# Patient Record
Sex: MALE | Race: AMERICAN INDIAN OR ALASKA NATIVE
[De-identification: names, ages, dates, MRNs, and addresses within clinical notes are randomized per-mention and may not be internally consistent; named-entity substitution may affect disease eponyms.]

---

## 2017-06-22 ENCOUNTER — HOSPITAL ENCOUNTER (OUTPATIENT)
Dept: HOSPITAL 5 - LAB | Age: 67
Discharge: HOME | End: 2017-06-22
Attending: FAMILY MEDICINE
Payer: COMMERCIAL

## 2017-06-22 DIAGNOSIS — I10: Primary | ICD-10-CM

## 2017-06-22 DIAGNOSIS — K21.0: ICD-10-CM

## 2017-06-22 DIAGNOSIS — E78.2: ICD-10-CM

## 2017-06-22 LAB
ALBUMIN SERPL-MCNC: 3.9 G/DL (ref 3.9–5)
ALBUMIN/GLOB SERPL: 1.1 %
ALP SERPL-CCNC: 63 UNITS/L (ref 35–129)
ALT SERPL-CCNC: 14 UNITS/L (ref 7–56)
ANION GAP SERPL CALC-SCNC: 17 MMOL/L
BASOPHILS NFR BLD AUTO: 0.6 % (ref 0–1.8)
BILIRUB SERPL-MCNC: 0.4 MG/DL (ref 0.1–1.2)
BUN SERPL-MCNC: 13 MG/DL (ref 9–20)
BUN/CREAT SERPL: 11.81 %
CALCIUM SERPL-MCNC: 10.1 MG/DL (ref 8.4–10.2)
CHLORIDE SERPL-SCNC: 106.3 MMOL/L (ref 98–107)
CHOLEST SERPL-MCNC: 170 MG/DL (ref 50–199)
CO2 SERPL-SCNC: 23 MMOL/L (ref 22–30)
CRP SERPL-MCNC: 0.2 MG/DL (ref 0–1.3)
EOSINOPHIL NFR BLD AUTO: 0.2 % (ref 0–4.3)
GLUCOSE SERPL-MCNC: 74 MG/DL (ref 75–100)
HCT VFR BLD CALC: 32.9 % (ref 35.5–45.6)
HDLC SERPL-MCNC: 56 MG/DL (ref 40–59)
HGB BLD-MCNC: 10.2 GM/DL (ref 11.8–15.2)
MCH RBC QN AUTO: 23 PG (ref 28–32)
MCHC RBC AUTO-ENTMCNC: 31 % (ref 32–34)
MCV RBC AUTO: 73 FL (ref 84–94)
PLATELET # BLD: 297 K/MM3 (ref 140–440)
POTASSIUM SERPL-SCNC: 4.1 MMOL/L (ref 3.6–5)
PROT SERPL-MCNC: 7.3 G/DL (ref 6.3–8.2)
PSA SERPL-MCNC: 4.37 NG/ML (ref 0–4)
RBC # BLD AUTO: 4.51 M/MM3 (ref 3.65–5.03)
SODIUM SERPL-SCNC: 142 MMOL/L (ref 137–145)
TRIGL SERPL-MCNC: 171 MG/DL (ref 2–149)
URATE SERPL-MCNC: 6 MG/DL (ref 3.5–7.6)
WBC # BLD AUTO: 4 K/MM3 (ref 4.5–11)

## 2017-06-22 PROCEDURE — 84550 ASSAY OF BLOOD/URIC ACID: CPT

## 2017-06-22 PROCEDURE — 80053 COMPREHEN METABOLIC PANEL: CPT

## 2017-06-22 PROCEDURE — 86140 C-REACTIVE PROTEIN: CPT

## 2017-06-22 PROCEDURE — 80061 LIPID PANEL: CPT

## 2017-06-22 PROCEDURE — 86803 HEPATITIS C AB TEST: CPT

## 2017-06-22 PROCEDURE — 36415 COLL VENOUS BLD VENIPUNCTURE: CPT

## 2017-06-22 PROCEDURE — 84153 ASSAY OF PSA TOTAL: CPT

## 2017-06-22 PROCEDURE — 85025 COMPLETE CBC W/AUTO DIFF WBC: CPT

## 2017-06-22 PROCEDURE — 84443 ASSAY THYROID STIM HORMONE: CPT

## 2017-06-22 PROCEDURE — 84436 ASSAY OF TOTAL THYROXINE: CPT

## 2018-04-26 ENCOUNTER — HOSPITAL ENCOUNTER (EMERGENCY)
Dept: HOSPITAL 5 - ED | Age: 68
LOS: 1 days | Discharge: HOME | End: 2018-04-27
Payer: COMMERCIAL

## 2018-04-26 DIAGNOSIS — Z88.8: ICD-10-CM

## 2018-04-26 DIAGNOSIS — M25.562: Primary | ICD-10-CM

## 2018-04-26 DIAGNOSIS — I10: ICD-10-CM

## 2018-04-26 PROCEDURE — 99283 EMERGENCY DEPT VISIT LOW MDM: CPT

## 2018-04-26 NOTE — XRAY REPORT
FINAL REPORT



PROCEDURE:  XR KNEE 3V LT



TECHNIQUE:  LEFT knee radiographs, AP, lateral and sunrise views.

CPT 79975







HISTORY:  left knee pain 



COMPARISON:  No prior studies are available for comparison.



FINDINGS:  

Fracture (s) and/or Dislocation(s): None .



Alignment: Normal .



Joint space(s): Normal .



Soft tissues: Normal .



Bone mineralization: Mild degree osteophyte formation is noted

involving tibial femoral and patellofemoral compartments..



Foreign bodies: None .







IMPRESSION:  

No acute fracture



Mild degree osteoarthritis.

## 2018-04-27 VITALS — DIASTOLIC BLOOD PRESSURE: 87 MMHG | SYSTOLIC BLOOD PRESSURE: 158 MMHG

## 2018-04-27 NOTE — EMERGENCY DEPARTMENT REPORT
ED Extremity Problem HPI





- General


Chief complaint: Extremity Injury, Lower


Stated complaint: LT KNEE PAIN


Time Seen by Provider: 04/27/18 01:04


Source: patient


Mode of arrival: Ambulatory


Limitations: No Limitations





- History of Present Illness


Initial comments: 





68-year-old -American male comes in for left knee pain 1 week.  Patient 

reports the pain is on initial getting but none during ambulation.  Patient 

reports has no relief from ibuprofen.  Patient does work driving a bus and 

moving cars.  He denies any recent trauma.


MD Complaint: extremity pain, joint paint





- Related Data


 Home Medications











 Medication  Instructions  Recorded  Confirmed  Last Taken


 


Quinapril HCl [Accupril] 1 tab PO DAILY 04/26/18 04/26/18 Unknown


 


amLODIPine 5 mg PO DAILY 04/26/18 04/26/18 Unknown








 Previous Rx's











 Medication  Instructions  Recorded  Last Taken  Type


 


Naproxen [Naprosyn TAB] 500 mg PO BID #60 tablet 04/27/18 Unknown Rx











 Allergies











Allergy/AdvReac Type Severity Reaction Status Date / Time


 


codeine Allergy  Seizure Unverified 06/22/17 09:57














ED Review of Systems


ROS: 


Stated complaint: LT KNEE PAIN


Other details as noted in HPI





Constitutional: denies: chills, fever


Eyes: denies: eye pain, eye discharge, vision change


ENT: denies: ear pain, throat pain


Respiratory: denies: cough, shortness of breath, wheezing


Cardiovascular: denies: chest pain, palpitations


Endocrine: no symptoms reported


Gastrointestinal: denies: abdominal pain, nausea, diarrhea


Genitourinary: denies: urgency, dysuria


Musculoskeletal: arthralgia


Skin: denies: rash, lesions


Neurological: denies: headache, weakness, paresthesias


Psychiatric: denies: anxiety, depression


Hematological/Lymphatic: denies: easy bleeding, easy bruising





ED Past Medical Hx





- Past Medical History


Hx Hypertension: Yes





- Surgical History


Past Surgical History?: No





- Social History


Smoking Status: Never Smoker


Substance Use Type: None





- Medications


Home Medications: 


 Home Medications











 Medication  Instructions  Recorded  Confirmed  Last Taken  Type


 


Quinapril HCl [Accupril] 1 tab PO DAILY 04/26/18 04/26/18 Unknown History


 


amLODIPine 5 mg PO DAILY 04/26/18 04/26/18 Unknown History


 


Naproxen [Naprosyn TAB] 500 mg PO BID #60 tablet 04/27/18  Unknown Rx














ED Physical Exam





- General


Limitations: No Limitations


General appearance: alert, in no apparent distress





- Head


Head exam: Present: atraumatic, normocephalic





- Eye


Eye exam: Present: normal appearance





- Respiratory


Respiratory exam: Present: normal lung sounds bilaterally.  Absent: respiratory 

distress





- Cardiovascular


Cardiovascular Exam: Present: regular rate, normal rhythm.  Absent: systolic 

murmur, diastolic murmur, rubs, gallop





- Expanded Lower Extremity Exam


  ** Left


Hip exam: Present: normal inspection, full ROM


Upper Leg exam: Present: normal inspection, full ROM


Knee exam: Present: normal inspection, full ROM, crepidus.  Absent: tenderness, 

swelling


Lower Leg exam: Present: normal inspection, full ROM.  Absent: tenderness, 

swelling


Ankle exam: Present: normal inspection, full ROM.  Absent: tenderness, swelling


Foot/Toe exam: Present: normal inspection


Gait: Positive: observed and normal





- Back Exam


Back exam: Present: normal inspection





- Neurological Exam


Neurological exam: Present: alert, oriented X3





- Psychiatric


Psychiatric exam: Present: normal affect, normal mood





- Skin


Skin exam: Present: warm, dry, intact, normal color.  Absent: rash





ED Course





 Vital Signs











  04/26/18





  22:15


 


Temperature 98.1 F


 


Pulse Rate 84


 


Respiratory 16





Rate 


 


Blood Pressure 167/93


 


O2 Sat by Pulse 96





Oximetry 














ED Medical Decision Making





- Radiology Data


Radiology results: report reviewed, image reviewed





IMPRESSION: 


No acute fracture 





Mild degree osteoarthritis. 














- Medical Decision Making





Patient's been evaluated by this provider fast track.  X-ray shows mild 

degenerative changes.  No dislocation no fractures.  Discussed with family and 

patient that this appears to be arthritis treatment issues E NSAIDs I would 

discharge patient on naproxen 500 mg twice a day.  Discussed the patient he 

should follow up with orthopedics for further evaluation and treatment options.


Critical care attestation.: 


If time is entered above; I have spent that time in minutes in the direct care 

of this critically ill patient, excluding procedure time.








ED Disposition


Clinical Impression: 


 Arthritis of left knee





Disposition: DC-01 TO HOME OR SELFCARE


Is pt being admited?: No


Does the pt Need Aspirin: No


Condition: Stable


Instructions:  Osteoarthritis (ED), Degenerative Disc Disease (ED)


Additional Instructions: 


Take pain medication as prescribed.  Please take her blood pressure medicine 

when you get home.  Please follow-up with orthopedics for further evaluation.


Prescriptions: 


Naproxen [Naprosyn TAB] 500 mg PO BID #60 tablet


Referrals: 


KANU SANDERSON JR, MD [Primary Care Provider] - 3-5 Days


LIZETH PERRY MD [Staff Physician] - 3-5 Days


ABIGAIL JENKINS MD [Staff Physician] - 3-5 Days


Forms:  Work/School Release Form(ED), Accompanied Note

## 2018-06-13 ENCOUNTER — HOSPITAL ENCOUNTER (OUTPATIENT)
Dept: HOSPITAL 5 - MRI | Age: 68
Discharge: HOME | End: 2018-06-13
Attending: ORTHOPAEDIC SURGERY
Payer: COMMERCIAL

## 2018-06-13 DIAGNOSIS — S83.242A: ICD-10-CM
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
## 2018-06-13 DIAGNOSIS — M22.42: ICD-10-CM
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
## 2018-06-13 DIAGNOSIS — M17.12: Primary | ICD-10-CM
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
## 2018-06-13 DIAGNOSIS — I10: ICD-10-CM

## 2018-06-13 PROCEDURE — 73721 MRI JNT OF LWR EXTRE W/O DYE: CPT

## 2018-07-10 ENCOUNTER — HOSPITAL ENCOUNTER (OUTPATIENT)
Dept: HOSPITAL 5 - OR | Age: 68
Discharge: HOME | End: 2018-07-10
Attending: ORTHOPAEDIC SURGERY
Payer: COMMERCIAL

## 2018-07-10 VITALS — DIASTOLIC BLOOD PRESSURE: 93 MMHG | SYSTOLIC BLOOD PRESSURE: 148 MMHG

## 2018-07-10 DIAGNOSIS — M23.92: ICD-10-CM
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
## 2018-07-10 DIAGNOSIS — K21.9: ICD-10-CM
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
## 2018-07-10 DIAGNOSIS — I10: ICD-10-CM

## 2018-07-10 DIAGNOSIS — M94.262: ICD-10-CM

## 2018-07-10 DIAGNOSIS — Z88.5: ICD-10-CM

## 2018-07-10 DIAGNOSIS — S83.242A: Primary | ICD-10-CM

## 2018-07-10 PROCEDURE — A4217 STERILE WATER/SALINE, 500 ML: HCPCS

## 2018-07-10 PROCEDURE — 29882 ARTHRS KNE SRG MNISC RPR M/L: CPT

## 2018-07-10 NOTE — ANESTHESIA CONSULTATION
Anesthesia Consult and Med Hx


Date of service: 07/10/18





- Airway


Anesthetic Teeth Evaluation: Good


ROM Head & Neck: Adequate


Mental/Hyoid Distance: Adequate


Mallampati Class: Class II


Intubation Access Assessment: Probably Good





- Pulmonary Exam


CTA: Yes





- Cardiac Exam


Cardiac Exam: RRR





- Pre-Operative Health Status


ASA Pre-Surgery Classification: ASA2


Proposed Anesthetic Plan: General (GA with LMA ok, GERD contolled)





- Pulmonary


Hx Smoking: No


Hx Sleep Apnea: No (SIDDHARTHA PRE SCREEN HIGH RISK.)





- Cardiovascular System


Hx Hypertension: Yes (X 10 YRS)





- Other Systems


Hx Cancer: No

## 2018-07-10 NOTE — PROCEDURE NOTE
Date of procedure: 07/10/18


Pre-op diagnosis: internal derangement left knee


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 

compartment)


Procedure: 





Arthroscopy left knee partial medial meniscectomy and abrasion chondroplasty 

medial compartment





Procedure


The patient was brought to the OR and placed on the OR table in supine position 

following induction and intubation by anesthesia the patient's left lower 

extremity was prepped and draped in the usual sterile manner.  A timeout 

procedure was done to identify the patient and the correct operative site.  The 

leg was exsanguinated followed by inflation of the pneumatic tourniquet to 300 

mmHg routine arthroscopic portals were made about the patella tendon following 

introduction of the arthroscope and insufflation of the joint with normal 

saline solution examination revealed these findings the patient was noted to 

have grade 3-3 chondromalacia involving both the medial femoral condyle and a 

corresponding tibial articular surfaces there is also a horizontal cleavage 

tear noted in the posterior horn of the medial meniscus the anterior cruciate 

ligament was intact the lateral compartment was explored the patient was there 

is having a intact lateral meniscus and some grade 1-2 changes in the articular 

surface in the lateral compartment following this the suprapatellar pouch was 

examined no loose bodies or other pathology was seen here. The arthroscopic 

shaver was introduced into the knee joint nexy the articular cartilage was then 

debrided back to healthy-appearing cartilage tissue followed by debridement of 

the posterior horn of the medial meniscus using a combination of biting forceps 

and the 4.0 shaver again care was taken to remove only tissue did appear to 

flap in and out of the knee joint following debridement the knee was copiously 

irrigated with saline solution the arthroscope was removed and the stab wound 

were repaired A mixture of Depo-Medrol and Marcaine was injected followed by 

placing routine postoperative dressings and Ace wraps to the thigh in the area 

the patient tolerated the procedure there were no complications he was sent to 

postanesthesia recovery in stable condition


Anesthesia: GETA


Surgeon: ABIGAIL JENKINS


Estimated blood loss: none


Pathology: list


Condition: stable


Disposition: PACU

## 2018-12-06 ENCOUNTER — HOSPITAL ENCOUNTER (OUTPATIENT)
Dept: HOSPITAL 5 - OR | Age: 68
Discharge: HOME | End: 2018-12-06
Attending: ORTHOPAEDIC SURGERY
Payer: COMMERCIAL

## 2018-12-06 VITALS — DIASTOLIC BLOOD PRESSURE: 84 MMHG | SYSTOLIC BLOOD PRESSURE: 152 MMHG

## 2018-12-06 DIAGNOSIS — Z88.5: ICD-10-CM

## 2018-12-06 DIAGNOSIS — K21.9: ICD-10-CM

## 2018-12-06 DIAGNOSIS — Z79.01: ICD-10-CM

## 2018-12-06 DIAGNOSIS — G89.29: ICD-10-CM

## 2018-12-06 DIAGNOSIS — Z79.82: ICD-10-CM

## 2018-12-06 DIAGNOSIS — Z91.81: ICD-10-CM

## 2018-12-06 DIAGNOSIS — I10: ICD-10-CM

## 2018-12-06 DIAGNOSIS — M25.562: Primary | ICD-10-CM

## 2018-12-06 DIAGNOSIS — M19.90: ICD-10-CM

## 2018-12-06 DIAGNOSIS — Z79.899: ICD-10-CM

## 2018-12-06 NOTE — PROCEDURE NOTE
Date of procedure: 12/06/18


Pre-op diagnosis: chronic left knee pain


Post-op diagnosis: same


Procedure: 





Left Geniculate Nerve Block under C-arm fluroscopy





procedure


The patient taken to the radiology fluroscopy suite where he was place on the 

table supine with padded triangular pad placed along the potileal fossa. The 

left knee prepped and draped in usual sterile fashion. 22-gauge spinal needle 

used to locate areas for injection, the medial and lateral supracondylar ridges 

as well as the medial border of the proximal tibia. These areas were anesthized 

using lidocaine 1% followed by placement of spinal needle near the medial, and 

lateral geniculate nerves. A mixture of marcaine and lidocaine injected into the

deeper structures. There were no complications noted and he tolerated well.


Anesthesia: local


Surgeon: ABIGAIL JENKINS


Estimated blood loss: minimal


Pathology: none


Condition: stable


Disposition: observation

## 2018-12-07 NOTE — XRAY REPORT
XRAY LEFT KNEE FOUR INTRAOPERATIVE FLUOROSCOPIC VIEWS: 12/06/18 07:47:00



CLINICAL: Left knee pain.



FINDINGS: No fracture or dislocation.  The joint spaces are preserved.  

Initial images show placement of a needle just superior to the medial 

condyle.  Subsequent images demonstrate a needle just superior to the 

lateral femoral condyle and the final image shows a needle projected 

over the proximal tibia at the level of the tibial tubercle.  For more 

detail, please refer to the operative report.

## 2019-01-08 ENCOUNTER — HOSPITAL ENCOUNTER (OUTPATIENT)
Dept: HOSPITAL 5 - GIO | Age: 69
Discharge: HOME | End: 2019-01-08
Attending: INTERNAL MEDICINE
Payer: COMMERCIAL

## 2019-01-08 VITALS — SYSTOLIC BLOOD PRESSURE: 152 MMHG | DIASTOLIC BLOOD PRESSURE: 98 MMHG

## 2019-01-08 DIAGNOSIS — Z91.81: ICD-10-CM

## 2019-01-08 DIAGNOSIS — Z79.899: ICD-10-CM

## 2019-01-08 DIAGNOSIS — K64.8: ICD-10-CM

## 2019-01-08 DIAGNOSIS — R63.4: ICD-10-CM

## 2019-01-08 DIAGNOSIS — K21.9: ICD-10-CM

## 2019-01-08 DIAGNOSIS — Z88.5: ICD-10-CM

## 2019-01-08 DIAGNOSIS — M19.90: ICD-10-CM

## 2019-01-08 DIAGNOSIS — Z79.82: ICD-10-CM

## 2019-01-08 DIAGNOSIS — K62.5: Primary | ICD-10-CM

## 2019-01-08 DIAGNOSIS — Z79.01: ICD-10-CM

## 2019-01-08 PROCEDURE — 45378 DIAGNOSTIC COLONOSCOPY: CPT

## 2019-01-08 RX ADMIN — SODIUM CHLORIDE SCH MLS: 0.9 INJECTION, SOLUTION INTRAVENOUS at 09:42

## 2019-01-08 NOTE — ANESTHESIA CONSULTATION
Anesthesia Consult and Med Hx


Date of service: 01/08/19





- Airway


Anesthetic Teeth Evaluation: Good


ROM Head & Neck: Adequate


Mental/Hyoid Distance: Adequate


Mallampati Class: Class III


Intubation Access Assessment: Good





- Pulmonary Exam


CTA: Yes





- Cardiac Exam


Cardiac Exam: No Murmur





- Pre-Operative Health Status


ASA Pre-Surgery Classification: ASA3


Proposed Anesthetic Plan: MAC





- Pulmonary


Hx Smoking: No


Hx Asthma: No


Hx Respiratory Symptoms: No


Hx Sleep Apnea: No (SIDDHARTHA PRE SCREEN HIGH RISK.)





- Cardiovascular System


Hx Hypertension: Yes


Hx Heart Attack/AMI: No


Hx Percutaneous Transluminal Coronary Angioplasty (PTCA): No


Hx Cardia Arrhythmia: No





- Central Nervous System


Hx Seizures: No


CVA: No





- Gastrointestinal


Hx Gastroesophageal Reflux Disease: Yes (well controlled)





- Endocrine


Hx Renal Disease: No


Hx Liver Disease: No


Hx Insulin Dependent Diabetes: No


Hx Non-Insulin Dependent Diabetes: No


Hx Thyroid Disease: No





- Other Systems


Hx Cancer: No


Hx Obesity: No

## 2019-01-08 NOTE — SHORT STAY SUMMARY
Short Stay Documentation


Date of service: 01/08/19


Narrative H&P: 





The patient presents for routine screening colonoscopy.  Last study over 10  

years ago.





- History


Past Medical History: arthritis, hyperthyroidism


Past Surgical History: Other (hemorrhoid surgery)


Social history: no significant social history, , lives with family, no 

smoking, no alcohol abuse





- Allergies and Medications


Current Medications: 


                                    Allergies





codeine Allergy (Verified 07/03/18 16:25)


   Dizziness





                                Home Medications











 Medication  Instructions  Recorded  Confirmed  Last Taken  Type


 


Quinapril HCl [Accupril] 1 tab PO DAILY 04/26/18 01/04/19 12/09/18 09:00 History


 


Aspirin [Lo-Dose Aspirin EC] 81 mg PO DAILY 07/03/18 01/04/19 12/08/18 09:00 

History


 


Omeprazole 40 mg PO DAILY 07/03/18 01/04/19 12/09/18 09:00 History


 


Glucosamine HCl 500 mg PO QDAY 07/10/18 01/04/19 12/09/18 09:00 History


 


amLODIPine/ATORVASTATIN [Caduet 5 1 each PO QDAY 07/10/18 01/04/19 12/09/18 

09:00 History





mg-10 mg Tablet]     


 


HYDROcodone/APAP 5-325 [Driscoll 1 each PO Q4HR PRN #20 tablet 12/11/18 01/04/19 

Unknown Rx





5-325 mg TAB]     








Active Medications





Sodium Chloride (Nacl 0.9% 1000 Ml)  1,000 mls @ 50 mls/hr IV AS DIRECT KP











- Physical exam


General appearance: no acute distress, well-nourished


Integumentary: no rash, no growths, no abnormal pigmentation


HEENT: Atraumatic, PERRLA, EOMI, Mucous membr. moist/pink


Lungs: Clear to auscultation


Breasts: deferred


Heart: Regular rate, Normal S1, Normal S2, No murmurs


Gastrointestinal: normoactive bowel sounds, no tenderness, no distended, no 

masses, no guarding, no organomegaly


Male Genitourinary: deferred


Rectal Exam: normal exam-external/orifice, normal rectal tone


Extremities: no ischemia, pulses intact, pulses symmetrical, No edema, normal 

temperature, Full ROM


Neurological: Normal gait, Normal speech, Strength at 5/5 X4 ext, Normal tone, 

Sensation intact, Cranial nerves 3-12 NL





- Brief post op/procedure progress note


Date of procedure: 01/08/19


Findings: 





see dictation


Estimated blood loss: none


Pathology: none


Condition: stable





- Disposition


Condition at discharge: Good


Disposition: DC-01 TO HOME OR SELFCARE





- Discharge Diagnoses


(1) Colon cancer screening


Status: Acute   





Short Stay Discharge Plan


Activity: other (no driving for 24 hours)


Weight Bearing Status: Full Weight Bearing


Diet: regular


Follow up with: 


KANU SANDERSON JR, MD [Primary Care Provider] - 7 Days

## 2019-01-08 NOTE — OPERATIVE REPORT
Operative Report


Operative Report: 





Date of procedure: 01/08/2019





Preprocedure diagnosis: Colon cancer screening.  Last study over 10 years ago.





Post procedure diagnosis: Normal study





Procedure: Colonoscopy to the cecum





Endoscopist: Dr. Hall





Anesthesia: Monitored anesthesia care per anesthesia department





Estimated blood loss: 0





Medications: Monitored anesthesia care.  See separate report by anesthesia for 

details.





After careful discussion of the nature and purpose of the procedure as well as 

details of the technique risks benefits and alternatives the patient gave 

consent.  Please see recent history and physical from the office.  The patient 

was placed in the left lateral decubitus position and medicated per anesthesia. 

A rectal exam was performed sphincter tone was normal there were no masses 

palpable.





The [employer] 570 scope was passed transanally and advanced under continuous 

direct vision without difficulty to the cecum.  The colon was well prepared.  

The cecum was normal.  The ascending colon was normal and on forward and 

retroflexed views.  The transverse colon, descending colon, and sigmoid colon 

were normal.  The rectum was normal on forward and retroflexed views.  The 

procedure was well-tolerated overall and the patient was observed in recovery.





Conclusions: Normal colonoscopy to the cecum.





Plan: Repeat colonoscopy in 10 years, sooner if clinically indicated.








Signed electronically: Joby Hall M.D.

## 2019-01-09 RX ADMIN — SODIUM CHLORIDE SCH MLS: 0.9 INJECTION, SOLUTION INTRAVENOUS at 08:01

## 2019-01-30 ENCOUNTER — HOSPITAL ENCOUNTER (INPATIENT)
Dept: HOSPITAL 5 - ED | Age: 69
LOS: 2 days | Discharge: HOME | DRG: 301 | End: 2019-02-01
Attending: INTERNAL MEDICINE | Admitting: INTERNAL MEDICINE
Payer: COMMERCIAL

## 2019-01-30 DIAGNOSIS — Z88.5: ICD-10-CM

## 2019-01-30 DIAGNOSIS — R00.0: ICD-10-CM

## 2019-01-30 DIAGNOSIS — M19.90: ICD-10-CM

## 2019-01-30 DIAGNOSIS — R06.82: ICD-10-CM

## 2019-01-30 DIAGNOSIS — D50.9: ICD-10-CM

## 2019-01-30 DIAGNOSIS — I82.413: Primary | ICD-10-CM

## 2019-01-30 DIAGNOSIS — I10: ICD-10-CM

## 2019-01-30 DIAGNOSIS — Z79.82: ICD-10-CM

## 2019-01-30 DIAGNOSIS — D72.819: ICD-10-CM

## 2019-01-30 DIAGNOSIS — Z72.89: ICD-10-CM

## 2019-01-30 DIAGNOSIS — Z79.899: ICD-10-CM

## 2019-01-30 DIAGNOSIS — K21.9: ICD-10-CM

## 2019-01-30 LAB
ALBUMIN SERPL-MCNC: 3.9 G/DL (ref 3.9–5)
ALT SERPL-CCNC: 10 UNITS/L (ref 7–56)
APTT BLD: 25.3 SEC. (ref 24.2–36.6)
BASOPHILS # (AUTO): 0 K/MM3 (ref 0–0.1)
BASOPHILS NFR BLD AUTO: 0.4 % (ref 0–1.8)
BILIRUB DIRECT SERPL-MCNC: < 0.2 MG/DL (ref 0–0.2)
BUN SERPL-MCNC: 13 MG/DL (ref 9–20)
BUN/CREAT SERPL: 13 %
CALCIUM SERPL-MCNC: 10.3 MG/DL (ref 8.4–10.2)
EOSINOPHIL # BLD AUTO: 0 K/MM3 (ref 0–0.4)
EOSINOPHIL NFR BLD AUTO: 0.1 % (ref 0–4.3)
HCT VFR BLD CALC: 32.5 % (ref 35.5–45.6)
HEMOLYSIS INDEX: 3
HGB BLD-MCNC: 10 GM/DL (ref 11.8–15.2)
INR PPP: 0.98 (ref 0.87–1.13)
LYMPHOCYTES # BLD AUTO: 1.5 K/MM3 (ref 1.2–5.4)
LYMPHOCYTES NFR BLD AUTO: 36.9 % (ref 13.4–35)
MCHC RBC AUTO-ENTMCNC: 31 % (ref 32–34)
MCV RBC AUTO: 75 FL (ref 84–94)
MONOCYTES # (AUTO): 0.4 K/MM3 (ref 0–0.8)
MONOCYTES % (AUTO): 9.6 % (ref 0–7.3)
PLATELET # BLD: 292 K/MM3 (ref 140–440)
RBC # BLD AUTO: 4.36 M/MM3 (ref 3.65–5.03)

## 2019-01-30 PROCEDURE — 93010 ELECTROCARDIOGRAM REPORT: CPT

## 2019-01-30 PROCEDURE — 83550 IRON BINDING TEST: CPT

## 2019-01-30 PROCEDURE — 83036 HEMOGLOBIN GLYCOSYLATED A1C: CPT

## 2019-01-30 PROCEDURE — 84484 ASSAY OF TROPONIN QUANT: CPT

## 2019-01-30 PROCEDURE — 93970 EXTREMITY STUDY: CPT

## 2019-01-30 PROCEDURE — 80048 BASIC METABOLIC PNL TOTAL CA: CPT

## 2019-01-30 PROCEDURE — 82747 ASSAY OF FOLIC ACID RBC: CPT

## 2019-01-30 PROCEDURE — 85025 COMPLETE CBC W/AUTO DIFF WBC: CPT

## 2019-01-30 PROCEDURE — 85610 PROTHROMBIN TIME: CPT

## 2019-01-30 PROCEDURE — 83880 ASSAY OF NATRIURETIC PEPTIDE: CPT

## 2019-01-30 PROCEDURE — 93005 ELECTROCARDIOGRAM TRACING: CPT

## 2019-01-30 PROCEDURE — 71045 X-RAY EXAM CHEST 1 VIEW: CPT

## 2019-01-30 PROCEDURE — 80053 COMPREHEN METABOLIC PANEL: CPT

## 2019-01-30 PROCEDURE — 80076 HEPATIC FUNCTION PANEL: CPT

## 2019-01-30 PROCEDURE — 85730 THROMBOPLASTIN TIME PARTIAL: CPT

## 2019-01-30 PROCEDURE — 36415 COLL VENOUS BLD VENIPUNCTURE: CPT

## 2019-01-30 PROCEDURE — 82607 VITAMIN B-12: CPT

## 2019-01-30 RX ADMIN — ENOXAPARIN SODIUM SCH MG: 100 INJECTION SUBCUTANEOUS at 21:36

## 2019-01-30 RX ADMIN — ENOXAPARIN SODIUM SCH MG: 100 INJECTION SUBCUTANEOUS at 16:56

## 2019-01-30 NOTE — VASCULAR LAB REPORT
FINAL REPORT



EXAM:  VL VENOUS DUPLEX LE BILAT



HISTORY:  PAIN IN LOWER RIGHT EXTREMITY 



COMPARISON:  None.



TECHNIQUE:  Duplex Doppler ultrasound of the veins of the bilateral lower extremities was performed.



FINDINGS:  

There is echogenic thrombus within the right lower common femoral vein, superficial femoral vein, pro
saqib branch, and popliteal vein. The right posterior tibial vein and peroneal vein are patent.



The left common femoral vein is patent. There is echogenic thrombus in the left superficial femoral v
ein, deep femoral vein, and popliteal vein. The left posterior tibial vein and peroneal vein are payan
nt. 



IMPRESSION:  

Deep venous thrombosis involving the right common femoral vein, superficial femoral vein, profunda br
anch, and popliteal vein.



Deep venous thrombosis involving the left superficial femoral vein, profunda branch, and popliteal ve
in.



Findings were discussed with Dr. Cancino at 12:19 p.m., Eastern standard time, on 01/30/2019.

## 2019-01-30 NOTE — EMERGENCY DEPARTMENT REPORT
ED General Adult HPI





- General


Chief complaint: Extremity Problem,Nontraumatic


Time Seen by Provider: 01/30/19 12:03


Source: patient


Mode of arrival: Ambulatory


Limitations: No Limitations





- History of Present Illness


Initial comments: 


69-year-old male with bilateral leg swelling since December.  The patient had 

work on his meniscus done on his left side and "a block of his right knee".  He 

has not had any recent traveling.  He went to his primary care doctor who sent 

him for a ultrasound.  His ultrasound was reported to be a proximal DVT of both 

his lower extremities.  He has not had prior DVT or anticoagulation.  He denies 

any chest pain cough or respiratory symptoms.  He states that his ankles have 

been intermittently swollen since December.





-: week(s), month(s)


Location: lower extremity


Quality: aching (mostly complains of swelling only)


Consistency: intermittent


Improves with: none


Worsens with: none


Associated Symptoms: denies other symptoms


Treatments Prior to Arrival: none





- Related Data


                                Home Medications











 Medication  Instructions  Recorded  Confirmed  Last Taken


 


Quinapril HCl [Accupril] 1 tab PO DAILY 04/26/18 01/04/19 12/09/18 09:00


 


Aspirin [Lo-Dose Aspirin EC] 81 mg PO DAILY 07/03/18 01/04/19 12/08/18 09:00


 


Omeprazole 40 mg PO DAILY 07/03/18 01/04/19 12/09/18 09:00


 


Glucosamine HCl 500 mg PO QDAY 07/10/18 01/04/19 12/09/18 09:00


 


amLODIPine/ATORVASTATIN [[place] 5 1 each PO QDAY 07/10/18 01/04/19 12/09/18 09:0

0





mg-10 mg Tablet]    








                                  Previous Rx's











 Medication  Instructions  Recorded  Last Taken  Type


 


HYDROcodone/APAP 5-325 [[place] 1 each PO Q4HR PRN #20 tablet 12/11/18 Unknown Rx





5-325 mg TAB]    











                                    Allergies











Allergy/AdvReac Type Severity Reaction Status Date / Time


 


codeine Allergy  Anaphylaxis Verified 01/30/19 11:37














ED Review of Systems


ROS: 


Stated complaint: 


Other details as noted in HPI





Constitutional: denies: chills, fever


Eyes: denies: eye pain, eye discharge, vision change


ENT: denies: ear pain, throat pain


Respiratory: denies: cough, shortness of breath, wheezing


Cardiovascular: edema.  denies: chest pain, palpitations


Endocrine: no symptoms reported


Gastrointestinal: denies: abdominal pain, nausea, diarrhea


Genitourinary: denies: urgency, dysuria


Musculoskeletal: as per HPI.  denies: back pain, joint swelling, arthralgia


Skin: denies: rash, lesions


Neurological: denies: headache, weakness, paresthesias


Psychiatric: denies: anxiety, depression


Hematological/Lymphatic: denies: easy bleeding, easy bruising





ED Past Medical Hx





- Past Medical History


Hx Hypertension: Yes


Hx Heart Attack/AMI: No


Hx GERD: Yes


Hx Liver Disease: No


Hx Renal Disease: No


Hx Arthritis: Yes


Hx Seizures: No


Hx Asthma: No


Hx HIV: No





- Surgical History


Past Surgical History?: Yes


Additional Surgical History: left knee





- Social History


Smoking Status: Never Smoker


Substance Use Type: Alcohol





- Medications


Home Medications: 


                                Home Medications











 Medication  Instructions  Recorded  Confirmed  Last Taken  Type


 


Quinapril HCl [Accupril] 1 tab PO DAILY 04/26/18 01/04/19 12/09/18 09:00 History


 


Aspirin [Lo-Dose Aspirin EC] 81 mg PO DAILY 07/03/18 01/04/19 12/08/18 09:00 

History


 


Omeprazole 40 mg PO DAILY 07/03/18 01/04/19 12/09/18 09:00 History


 


Glucosamine HCl 500 mg PO QDAY 07/10/18 01/04/19 12/09/18 09:00 History


 


amLODIPine/ATORVASTATIN [[place] 5 1 each PO QDAY 07/10/18 01/04/19 12/09/18 

09:00 History





mg-10 mg Tablet]     


 


HYDROcodone/APAP 5-325 [[place] 1 each PO Q4HR PRN #20 tablet 12/11/18 01/04/19 

Unknown Rx





5-325 mg TAB]     














ED Physical Exam





- General


Limitations: No Limitations


General appearance: alert, in no apparent distress





- Head


Head exam: Present: atraumatic, normocephalic





- Eye


Eye exam: Present: normal appearance.  Absent: scleral icterus





- ENT


ENT exam: Present: mucous membranes moist





- Neck


Neck exam: Present: normal inspection.  Absent: tenderness





- Respiratory


Respiratory exam: Present: normal lung sounds bilaterally.  Absent: respiratory 

distress





- Cardiovascular


Cardiovascular Exam: Present: regular rate, normal rhythm.  Absent: systolic 

murmur, diastolic murmur, rubs, gallop





- GI/Abdominal


GI/Abdominal exam: Present: soft, normal bowel sounds.  Absent: distended, 

tenderness, guarding, rebound





- Rectal


Rectal exam: Present: deferred





- Extremities Exam


Extremities exam: Present: normal inspection, pedal edema, other (ankle edema 

bilaterally).  Absent: calf tenderness





- Back Exam


Back exam: Present: normal inspection.  Absent: paraspinal tenderness, vertebral

tenderness





- Neurological Exam


Neurological exam: Present: alert, oriented X3, CN II-XII intact.  Absent: motor

sensory deficit





- Psychiatric


Psychiatric exam: Present: normal affect, normal mood





- Skin


Skin exam: Present: warm, dry, intact, normal color.  Absent: rash





ED Course


                                   Vital Signs











  01/30/19 01/30/19 01/30/19





  11:37 11:42 11:46


 


Temperature 98.4 F  


 


Pulse Rate 72  71


 


Respiratory 18 26 H 20





Rate   


 


Blood Pressure 169/87  175/91


 


O2 Sat by Pulse 96  97





Oximetry   














  01/30/19 01/30/19





  12:46 13:44


 


Temperature  


 


Pulse Rate 72 


 


Respiratory 26 H 16





Rate  


 


Blood Pressure 165/91 


 


O2 Sat by Pulse 98 96





Oximetry  














- Reevaluation(s)


Reevaluation #1: 


Discussed with Dr. Robbins.  He requests hospitalization by the hospitalist 

service.  Patient will be anticoagulated.  I will discuss with Dr. Santana the mode

of anticoagulation.


01/30/19 15:21





Reevaluation #2: 


Discussed with Dr. Santana.  He requests that I begin Lovenox and admit the patient

to [facility].


01/30/19 15:39








ED Medical Decision Making





- Lab Data


Result diagrams: 


                                 01/30/19 12:15





                                 01/30/19 12:15








                         Laboratory Results - last 24 hr











  01/30/19 01/30/19 01/30/19





  12:15 12:15 12:15


 


WBC  4.2 L  


 


RBC  4.36  


 


Hgb  10.0 L  


 


Hct  32.5 L  


 


MCV  75 L  


 


MCH  23 L  


 


MCHC  31 L  


 


RDW  19.7 H  


 


Plt Count  292  


 


Lymph % (Auto)  36.9 H  


 


Mono % (Auto)  9.6 H  


 


Eos % (Auto)  0.1  


 


Baso % (Auto)  0.4  


 


Lymph #  1.5  


 


Mono #  0.4  


 


Eos #  0.0  


 


Baso #  0.0  


 


Seg Neutrophils %  53.0  


 


Seg Neutrophils #  2.2  


 


PT   13.4 


 


INR   0.98 


 


APTT   25.3 


 


Sodium    138


 


Potassium    4.0


 


Chloride    103.8


 


Carbon Dioxide    23


 


Anion Gap    15


 


BUN    13


 


Creatinine    1.0


 


Estimated GFR    > 60


 


BUN/Creatinine Ratio    13


 


Glucose    88


 


Calcium    10.3 H


 


Total Bilirubin    0.30


 


Direct Bilirubin    < 0.2


 


Indirect Bilirubin    0.1


 


AST    18


 


ALT    10


 


Alkaline Phosphatase    62


 


Troponin T    < 0.010


 


NT-Pro-B Natriuret Pep    41.14


 


Total Protein    7.2


 


Albumin    3.9


 


Albumin/Globulin Ratio    1.2














- Radiology Data


Radiology results: report reviewed (bilateral proximal DVT)


Critical care attestation.: 


If time is entered above; I have spent that time in minutes in the direct care 

of this critically ill patient, excluding procedure time.








ED Disposition


Clinical Impression: 


DVT, bilateral lower limbs


Qualifiers:


 Affected thrombotic vein of extremity: femoral Chronicity: acute Qualified 

Code(s): I82.413 - Acute embolism and thrombosis of femoral vein, bilateral





Disposition: DC-09 OP ADMIT IP TO THIS HOSP


Is pt being admited?: Yes


Does the pt Need Aspirin: No


Condition: Stable


Referrals: 


KANU SANDERSON JR, MD [Primary Care Provider] - 3-5 Days


Time of Disposition: 15:23

## 2019-01-31 LAB
IRON SERPL-MCNC: 33 UG/DL (ref 49–181)
TIBC SERPL-MCNC: 398 MCG/DL (ref 250–450)

## 2019-01-31 RX ADMIN — PANTOPRAZOLE SODIUM SCH MG: 40 TABLET, DELAYED RELEASE ORAL at 12:33

## 2019-01-31 RX ADMIN — Medication SCH ML: at 10:05

## 2019-01-31 RX ADMIN — ENOXAPARIN SODIUM SCH MG: 100 INJECTION SUBCUTANEOUS at 06:26

## 2019-01-31 RX ADMIN — ENOXAPARIN SODIUM SCH MG: 100 INJECTION SUBCUTANEOUS at 17:19

## 2019-01-31 RX ADMIN — SODIUM CHLORIDE SCH MLS/HR: 0.9 INJECTION, SOLUTION INTRAVENOUS at 10:04

## 2019-01-31 RX ADMIN — Medication SCH ML: at 22:56

## 2019-01-31 RX ADMIN — LISINOPRIL SCH MG: 20 TABLET ORAL at 12:33

## 2019-01-31 RX ADMIN — ASPIRIN SCH MG: 81 TABLET, COATED ORAL at 10:04

## 2019-01-31 RX ADMIN — AMLODIPINE BESYLATE SCH MG: 5 TABLET ORAL at 12:32

## 2019-01-31 RX ADMIN — SODIUM CHLORIDE SCH MLS/HR: 0.9 INJECTION, SOLUTION INTRAVENOUS at 22:57

## 2019-01-31 NOTE — HISTORY AND PHYSICAL REPORT
History of Present Illness


Date of examination: 01/30/19


Date of admission: 


01/30/19 15:36





Chief complaint: 


Bilateral lower extremity swelling for 3 weeks to 4 weeks





History of present illness: 


69-year-old -American male was sent from Dr. No's office for b

ilateral DVT in the lower extremities.  Patient felt tightness in both the lower

extremities from the upper thigh to the region.  No shortness of breath.  

Patient had ultrasound and was found to have bilateral DVTs in both lower 

extremities.  Patient has bilateral leg swelling since December.  No recent 

trauma.  Left meniscus surgery.





Past Medical History


Hx Hypertension: Yes


Hx GERD: Yes


Hx Arthritis: Yes








Surgical History


Past Surgical History?: Yes


Additional Surgical History: left knee





Social History


Smoking Status: Never Smoker


Substance Use Type: Alcohol





Medications


Home Medications: 


                                Home Medications











 Medication  Instructions  Recorded  Confirmed  Last Taken  Type


 


Quinapril HCl [Accupril] 1 tab PO DAILY 04/26/18 01/04/19 12/09/18 09:00 History


 


Aspirin [Lo-Dose Aspirin EC] 81 mg PO DAILY 07/03/18 01/04/19 12/08/18 09:00 

History


 


Omeprazole 40 mg PO DAILY 07/03/18 01/04/19 12/09/18 09:00 History


 


Glucosamine HCl 500 mg PO QDAY 07/10/18 01/04/19 12/09/18 09:00 History


 


amLODIPine/ATORVASTATIN [Caduet 5 1 each PO QDAY 07/10/18 01/04/19 12/09/18 

09:00 History





mg-10 mg Tablet]     


 


HYDROcodone/APAP 5-325 [[place] 1 each PO Q4HR PRN #20 tablet 12/11/18 01/04/19 

Unknown Rx





5-325 mg TAB]     











Review of Systems


ROS: 


Stated complaint: 


Other details as noted in HPI





Constitutional: denies: chills, fever


Eyes: denies: eye pain, eye discharge, vision change


ENT: denies: ear pain, throat pain


Respiratory: denies: cough, shortness of breath, wheezing


Cardiovascular: edema.  denies: chest pain, palpitations


Endocrine: no symptoms reported


Gastrointestinal: denies: abdominal pain, nausea, diarrhea


Genitourinary: denies: urgency, dysuria


Musculoskeletal: as per HPI.  denies: back pain, joint swelling, arthralgia


Skin: denies: rash, lesions


Neurological: denies: headache, weakness, paresthesias


Psychiatric: denies: anxiety, depression


Hematological/Lymphatic: denies: easy bleeding, easy bruising

















Medications and Allergies


                                    Allergies











Allergy/AdvReac Type Severity Reaction Status Date / Time


 


codeine Allergy  Anaphylaxis Verified 01/30/19 11:37











                                Home Medications











 Medication  Instructions  Recorded  Confirmed  Last Taken  Type


 


Quinapril HCl [Accupril] 1 tab PO DAILY 04/26/18 01/30/19 01/29/19 21:00 History


 


Aspirin [Lo-Dose Aspirin EC] 81 mg PO DAILY 07/03/18 01/30/19 12/08/18 09:00 

History


 


Omeprazole 40 mg PO DAILY 07/03/18 01/30/19 01/29/19 21:00 History


 


Glucosamine HCl 500 mg PO QDAY 07/10/18 01/30/19 12/09/18 09:00 History


 


amLODIPine/ATORVASTATIN [Caduet 5 1 each PO QDAY 07/10/18 01/30/19 01/29/19 

21:00 History





mg-10 mg Tablet]     


 


HYDROcodone/APAP 5-325 [[place] 1 each PO Q4HR PRN #20 tablet 12/11/18 01/30/19 

Unknown Rx





5-325 mg TAB]     











Active Meds: 


Active Medications





Enoxaparin Sodium (Lovenox)  80 mg 1 mg/kg (80 mg) SUB-Q Q12HR KP


   Last Admin: 01/30/19 21:36 Dose:  80 mg


   Documented by: 











Exam





- Constitutional


Vitals: 


                                        











Temp Pulse Resp BP Pulse Ox


 


 98.7 F   97 H  20   152/93   95 


 


 01/30/19 20:57  01/30/19 22:00  01/30/19 20:57  01/30/19 20:57  01/30/19 20:57











General appearance: Present: no acute distress, well-nourished





- EENT


Eyes: Present: PERRL


ENT: hearing intact, clear oral mucosa





- Neck


Neck: Present: supple, normal ROM





- Respiratory


Respiratory effort: normal


Respiratory: bilateral: CTA





- Cardiovascular


Heart rate: 78


Rhythm: regular


Heart Sounds: Present: S1 & S2.  Absent: rub, click





- Extremities


Extremities: no ischemia, pulses intact, pulses symmetrical, No edema, abnormal 

(bilateral lower extremity swelling from upper thigh to upper cough region)


Peripheral Pulses: within normal limits





- Abdominal


General gastrointestinal: Present: soft, non-tender, non-distended, normal bowel

sounds


Male genitourinary: Present: normal





- Integumentary


Integumentary: Present: clear, warm, dry





- Musculoskeletal


Musculoskeletal: gait normal, strength equal bilaterally





- Psychiatric


Psychiatric: appropriate mood/affect, intact judgment & insight





- Neurologic


Neurologic: CNII-XII intact, moves all extremities





- Allied Health


Allied health notes reviewed: nursing, case management





Results





- Labs


CBC & Chem 7: 


                                 01/30/19 12:15





                                 01/30/19 12:15


Labs: 


                             Laboratory Last Values











WBC  4.2 K/mm3 (4.5-11.0)  L  01/30/19  12:15    


 


RBC  4.36 M/mm3 (3.65-5.03)   01/30/19  12:15    


 


Hgb  10.0 gm/dl (11.8-15.2)  L  01/30/19  12:15    


 


Hct  32.5 % (35.5-45.6)  L  01/30/19  12:15    


 


MCV  75 fl (84-94)  L  01/30/19  12:15    


 


MCH  23 pg (28-32)  L  01/30/19  12:15    


 


MCHC  31 % (32-34)  L  01/30/19  12:15    


 


RDW  19.7 % (13.2-15.2)  H  01/30/19  12:15    


 


Plt Count  292 K/mm3 (140-440)   01/30/19  12:15    


 


Lymph % (Auto)  36.9 % (13.4-35.0)  H  01/30/19  12:15    


 


Mono % (Auto)  9.6 % (0.0-7.3)  H  01/30/19  12:15    


 


Eos % (Auto)  0.1 % (0.0-4.3)   01/30/19  12:15    


 


Baso % (Auto)  0.4 % (0.0-1.8)   01/30/19  12:15    


 


Lymph #  1.5 K/mm3 (1.2-5.4)   01/30/19  12:15    


 


Mono #  0.4 K/mm3 (0.0-0.8)   01/30/19  12:15    


 


Eos #  0.0 K/mm3 (0.0-0.4)   01/30/19  12:15    


 


Baso #  0.0 K/mm3 (0.0-0.1)   01/30/19  12:15    


 


Seg Neutrophils %  53.0 % (40.0-70.0)   01/30/19  12:15    


 


Seg Neutrophils #  2.2 K/mm3 (1.8-7.7)   01/30/19  12:15    


 


PT  13.4 Sec. (12.2-14.9)   01/30/19  12:15    


 


INR  0.98  (0.87-1.13)   01/30/19  12:15    


 


APTT  25.3 Sec. (24.2-36.6)   01/30/19  12:15    


 


Sodium  138 mmol/L (137-145)   01/30/19  12:15    


 


Potassium  4.0 mmol/L (3.6-5.0)   01/30/19  12:15    


 


Chloride  103.8 mmol/L ()   01/30/19  12:15    


 


Carbon Dioxide  23 mmol/L (22-30)   01/30/19  12:15    


 


Anion Gap  15 mmol/L  01/30/19  12:15    


 


BUN  13 mg/dL (9-20)   01/30/19  12:15    


 


Creatinine  1.0 mg/dL (0.8-1.5)   01/30/19  12:15    


 


Estimated GFR  > 60 ml/min  01/30/19  12:15    


 


BUN/Creatinine Ratio  13 %  01/30/19  12:15    


 


Glucose  88 mg/dL ()   01/30/19  12:15    


 


Calcium  10.3 mg/dL (8.4-10.2)  H  01/30/19  12:15    


 


Total Bilirubin  0.30 mg/dL (0.1-1.2)   01/30/19  12:15    


 


Direct Bilirubin  < 0.2 mg/dL (0-0.2)   01/30/19  12:15    


 


Indirect Bilirubin  0.1 mg/dL  01/30/19  12:15    


 


AST  18 units/L (5-40)   01/30/19  12:15    


 


ALT  10 units/L (7-56)   01/30/19  12:15    


 


Alkaline Phosphatase  62 units/L ()   01/30/19  12:15    


 


Troponin T  < 0.010 ng/mL (0.00-0.029)   01/30/19  12:15    


 


NT-Pro-B Natriuret Pep  41.14 pg/mL (0-900)   01/30/19  12:15    


 


Total Protein  7.2 g/dL (6.3-8.2)   01/30/19  12:15    


 


Albumin  3.9 g/dL (3.9-5)   01/30/19  12:15    


 


Albumin/Globulin Ratio  1.2 %  01/30/19  12:15    








                                    Short CBC











  01/30/19 Range/Units





  12:15 


 


WBC  4.2 L  (4.5-11.0)  K/mm3


 


Hgb  10.0 L  (11.8-15.2)  gm/dl


 


Hct  32.5 L  (35.5-45.6)  %


 


Plt Count  292  (140-440)  K/mm3








                                       BMP











  01/30/19





  12:15


 


Sodium  138


 


Potassium  4.0


 


Chloride  103.8


 


Carbon Dioxide  23


 


BUN  13


 


Creatinine  1.0


 


Glucose  88


 


Calcium  10.3 H








                                 Cardiac Enzymes











  01/30/19 Range/Units





  12:15 


 


Troponin T  < 0.010  (0.00-0.029)  ng/mL








                                 Liver Function











  01/30/19 Range/Units





  12:15 


 


Total Bilirubin  0.30  (0.1-1.2)  mg/dL


 


Direct Bilirubin  < 0.2  (0-0.2)  mg/dL


 


AST  18  (5-40)  units/L


 


ALT  10  (7-56)  units/L


 


Alkaline Phosphatase  62  ()  units/L


 


Albumin  3.9  (3.9-5)  g/dL














- Imaging and Cardiology


Chest x-ray: report reviewed (naf)


Imaging and Cardiology: 


Venous duplex scan of both lower extremities


PRELIMINARY GIVEN TO Aria BLAKELY at Dr. Robbisn office; bilat DVT appears to be 

acute. Was told to take pt to ED.


Initialized on 01/30/19 11:28 - END OF NOTE








IMPRESSION: 


Deep venous thrombosis involving the right common femoral vein, superficial f

emoral vein, profunda 


branch, and popliteal vein. 





Deep venous thrombosis involving the left superficial femoral vein, profunda 

branch, and popliteal 


vein. 





Findings were discussed with Dr. Cancino at 12:19 p.m., Eastern standard time, on 

01/30/2019. 

















Assessment and Plan


Advance Directives: Yes (full code)


VTE prophylaxis?: Chemical


Plan of care discussed with patient/family: Yes





- Patient Problems


(1) DVT, bilateral lower limbs


Current Visit: Yes   Status: Acute   


Qualifiers: 


   Affected thrombotic vein of extremity: femoral   Chronicity: acute   Qualifie

d Code(s): I82.413 - Acute embolism and thrombosis of femoral vein, bilateral   


Plan to address problem: 


Patient initiated on Lovenox and Coumadin


Will defer to primary team regarding ELIQUIS








(2) Hypertension


Current Visit: Yes   Status: Chronic   


Qualifiers: 


   Hypertension type: essential hypertension   Qualified Code(s): I10 - 

Essential (primary) hypertension   


Plan to address problem: 


Continue antihypertensives








(3) GERD (gastroesophageal reflux disease)


Current Visit: Yes   Status: Chronic   


Qualifiers: 


   Esophagitis presence: without esophagitis   Qualified Code(s): K21.9 - 

Gastro-esophageal reflux disease without esophagitis   


Plan to address problem: 


Continue PPIs








(4) Anemia


Current Visit: Yes   Status: Chronic   


Qualifiers: 


   Anemia type: unspecified type   Qualified Code(s): D64.9 - Anemia, 

unspecified   


Plan to address problem: 


Anemia workup








(5) DVT prophylaxis


Current Visit: Yes   Status: Acute   


Plan to address problem: 


On Lovenox and GI prophylaxis

## 2019-01-31 NOTE — PROGRESS NOTE
Assessment and Plan





/ DVT, bilateral lower limbs


Patient initiated on Lovenox and Coumadin


will consult hematology regarding ELIQUIS








/ Hypertension


Continue home antihypertensives, stable








/ GERD (gastroesophageal reflux disease)


Continue PPIs








/ Anemia, microcytic


Anemia workup








/ DVT prophylaxis


On Lovenox and GI prophylaxis











Subjective


Date of service: 01/31/19


Interval history: 





patient seen and examined


Family at bedside, updated


denies any chest pain or SOB





Objective





- Constitutional


Vitals: 


                               Vital Signs - 12hr











  01/31/19 01/31/19 01/31/19





  04:46 07:28 10:00


 


Temperature  98.5 F 


 


Pulse Rate 89 84 84


 


Respiratory  18 





Rate   


 


Blood Pressure  153/93 


 


O2 Sat by Pulse  97 





Oximetry   














  01/31/19 01/31/19 01/31/19





  12:32 12:33 13:23


 


Temperature   98.2 F


 


Pulse Rate 84 84 71


 


Respiratory   18





Rate   


 


Blood Pressure 153/93 153/93 162/88


 


O2 Sat by Pulse   97





Oximetry   














  01/31/19





  13:25


 


Temperature 


 


Pulse Rate 


 


Respiratory 





Rate 


 


Blood Pressure 163/98


 


O2 Sat by Pulse 





Oximetry 











General appearance: Present: no acute distress, well-nourished





- EENT


Eyes: PERRL, EOM intact


ENT: hearing intact, clear oral mucosa


Ears: bilateral: normal





- Neck


Neck: supple, normal ROM





- Respiratory


Respiratory effort: normal


Respiratory: bilateral: CTA





- Cardiovascular


Rhythm: regular


Heart Sounds: Present: S1 & S2.  Absent: gallop, rub


Extremities: pulses intact, No edema, normal color, Full ROM





- Gastrointestinal


General gastrointestinal: Present: soft, non-tender, non-distended, normal bowel

sounds





- Integumentary


Integumentary: clear, warm, dry





- Musculoskeletal


Musculoskeletal: 1, strength equal bilaterally





- Neurologic


Neurologic: moves all extremities





- Psychiatric


Psychiatric: memory intact, appropriate mood/affect, intact judgment & insight





- Labs


CBC & Chem 7: 


                                 02/01/19 06:47





                                 02/01/19 06:47

## 2019-02-01 LAB
ALBUMIN SERPL-MCNC: 3.3 G/DL (ref 3.9–5)
ALT SERPL-CCNC: 10 UNITS/L (ref 7–56)
BASOPHILS # (AUTO): 0 K/MM3 (ref 0–0.1)
BASOPHILS NFR BLD AUTO: 0.4 % (ref 0–1.8)
BUN SERPL-MCNC: 9 MG/DL (ref 9–20)
BUN/CREAT SERPL: 9 %
CALCIUM SERPL-MCNC: 9.8 MG/DL (ref 8.4–10.2)
EOSINOPHIL # BLD AUTO: 0 K/MM3 (ref 0–0.4)
EOSINOPHIL NFR BLD AUTO: 0.3 % (ref 0–4.3)
HCT VFR BLD CALC: 31.8 % (ref 35.5–45.6)
HEMOLYSIS INDEX: 6
HGB BLD-MCNC: 9.6 GM/DL (ref 11.8–15.2)
INR PPP: 0.98 (ref 0.87–1.13)
IRON SERPL-MCNC: 19 UG/DL (ref 49–181)
LYMPHOCYTES # BLD AUTO: 1.5 K/MM3 (ref 1.2–5.4)
LYMPHOCYTES NFR BLD AUTO: 36.3 % (ref 13.4–35)
MCHC RBC AUTO-ENTMCNC: 30 % (ref 32–34)
MCV RBC AUTO: 76 FL (ref 84–94)
MONOCYTES # (AUTO): 0.4 K/MM3 (ref 0–0.8)
MONOCYTES % (AUTO): 9.1 % (ref 0–7.3)
PLATELET # BLD: 260 K/MM3 (ref 140–440)
RBC # BLD AUTO: 4.21 M/MM3 (ref 3.65–5.03)
TIBC SERPL-MCNC: 351 MCG/DL (ref 250–450)

## 2019-02-01 RX ADMIN — Medication SCH: at 10:46

## 2019-02-01 RX ADMIN — ASPIRIN SCH MG: 81 TABLET, COATED ORAL at 09:13

## 2019-02-01 RX ADMIN — ENOXAPARIN SODIUM SCH MG: 100 INJECTION SUBCUTANEOUS at 05:32

## 2019-02-01 RX ADMIN — AMLODIPINE BESYLATE SCH MG: 5 TABLET ORAL at 09:13

## 2019-02-01 RX ADMIN — LISINOPRIL SCH MG: 20 TABLET ORAL at 09:12

## 2019-02-01 RX ADMIN — PANTOPRAZOLE SODIUM SCH MG: 40 TABLET, DELAYED RELEASE ORAL at 09:15

## 2019-02-01 NOTE — DISCHARGE SUMMARY
Providers





- Providers


Date of Admission: 


01/30/19 15:36





Date of discharge: 02/01/19


Attending physician: 


TASHA BAI





                                        





01/31/19 13:07


Consult to Physician [CONS] Routine 


   Comment: called office/ ananth


   Consulting Provider: ADAM YEE


   Physician Instructions: 


   Reason For Exam: b/l DVT











Primary care physician: 


KANU SANDERSON








Hospitalization


Condition: Stable


Pertinent studies: 





CXR


Venous Doppler








Hospital course: 


69-year-old -American male who was complaining of tightness in both the 

lower extremities for last 3-4 weeks. Patient had ultrasound and was found to 

have bilateral DVTs in both lower extremities. He was then sent from Dr. No's office for definitive management. 





Discharge diagnosis and management:


/ DVT, bilateral lower limbs


Patient initiated on Lovenox and Coumadin


Consulted hematology, changed to ELIQUIS on discharge


He will f/u with hematology outpt








/ Hypertension


Continue home antihypertensives, stable








/ GERD (gastroesophageal reflux disease)


Continue PPIs








/ Anemia, microcytic


Anemia workup





/Intermittent borderline tachycardia and tachypnea


- likely from stress, no infectoius etiology suspected, no fever





/leukopenia, could be chronic, f/u outpt





/ DVT prophylaxis, On Lovenox 














Disposition: DC-01 TO HOME OR SELFCARE


Time spent for discharge: 32 minutes





Core Measure Documentation





- Palliative Care


Palliative Care/ Comfort Measures: Not Applicable





- Core Measures


Any of the following diagnoses?: none





Exam





- Constitutional


Vitals: 


                                        











Temp Pulse Resp BP Pulse Ox


 


 98.5 F   75   18   154/87   97 


 


 02/01/19 02:39  02/01/19 10:00  02/01/19 02:39  02/01/19 09:13  02/01/19 02:39











General appearance: Present: no acute distress, well-nourished





- EENT


Eyes: Present: PERRL


ENT: hearing intact, clear oral mucosa





- Neck


Neck: Present: supple, normal ROM





- Respiratory


Respiratory effort: normal


Respiratory: bilateral: CTA





- Cardiovascular


Heart Sounds: Present: S1 & S2.  Absent: rub, click





- Extremities


Extremities: pulses symmetrical, No edema


Peripheral Pulses: within normal limits





- Abdominal


General gastrointestinal: Present: soft, non-tender, non-distended, normal bowel

 sounds





- Integumentary


Integumentary: Present: clear, warm, dry





- Musculoskeletal


Musculoskeletal: gait normal, strength equal bilaterally





- Psychiatric


Psychiatric: appropriate mood/affect, intact judgment & insight





- Neurologic


Neurologic: CNII-XII intact, moves all extremities





Plan


Activity: advance as tolerated


Weight Bearing Status: Non-Weight Bearing


Diet: low fat, low salt


Follow up with: 


KANU SANDERSON JR, MD [Primary Care Provider] - 3-5 Days


ADAM YEE MD [Staff Physician] - 7 Days


Prescriptions: 


Apixaban [Eliquis] 10 mg PO BID 7 Days  tablet


Apixaban [Eliquis] 5 mg PO BID #60 tab.ds.pk


Ferrous Gluconate [Fergon 325 MG tab] 325 mg PO QDAY #30 tablet

## 2019-02-02 NOTE — CONSULTATION
REFERRED BY:  Kiana Griggs MD



REASON FOR CONSULTATION:  Bilateral lower extremity deep venous thrombosis.



HISTORY OF PRESENT ILLNESS:  I saw the patient is a 69-year-old male in the

medical floor.  The patient has been having leg swelling for a few weeks.  In

the past, this had gone down and elevation and disappeared after some time, but

for the last 3-4 weeks, he has been having bilateral leg swelling.  He went to

Dr. No's office because of tightness in the lower extremities from upper

thigh region to down.  No chest pain, no shortness of breath.  Venous Doppler

was done that showed bilateral femoral DVT, 1 superficial femoral, 1 common

femoral.  No recent history of long distance travel.  No recent surgeries.  The

patient is a nonsmoker.  No personal or family history of DVT or PE.  The

patient has been started on anticoagulation.



PAST MEDICAL HISTORY:  Hypertension, GERD, arthritis.



PAST SURGICAL HISTORY:  Left knee surgery.



SOCIAL HISTORY:  Nonsmoker.



HOME MEDICATIONS:  Accupril, omeprazole, amlodipine, atorvastatin, hydrocodone.



REVIEW OF SYSTEMS:  No fever or chills.  No ear discharge.  No vision changes. 

No chest pain, no shortness of breath, no cough, no palpitations, no

hematemesis, no hematochezia.  No nausea, vomiting, diarrhea.  No rash.  No

seizure or syncope.



ALLERGIES:  CODEINE.



PHYSICAL EXAMINATION:

VITAL SIGNS:  Temperature 98, pulse 82, respirations 18, /91.

HEENT:  Mild pallor, no icterus.

NECK:  No neck lymph nodes.

HEART:  S1, S2.

LUNGS:  Clear to auscultation.

ABDOMEN:  Soft.

EXTREMITIES:  Bilateral leg edema present.

NEUROLOGIC:  Alert, awake, oriented.



LABORATORY DATA:  White cell 4, hemoglobin 9.6, MCV 75, platelet 260.  PT, PTT

normal.  Potassium 3.9, creatinine 1, calcium 9.8, serum iron 19, bilirubin 0.3,

AST 16, ALT 10.  Vitamin B12 of 1042.



RADIOLOGY:  Lower extremity Doppler, thrombus right lower common femoral vein

and left superficial femoral vein.



ASSESSMENT AND PLAN:

1.  Bilateral lower extremity deep venous thrombosis.  No chest pain, shortness

of breath.  The patient was started on Lovenox and Coumadin.  I discussed with

the patient regarding Eliquis.

2.  Microcytic anemia, low iron and discussed with the patient regarding the

same.  The patient has had a colonoscopy.  He has not had

esophagogastroduodenoscopy.

3.  Oral iron supplementation discussed.

4.  History of hypertension.



I will see the patient in the clinic setting.





DD: 02/01/2019 19:29

DT: 02/02/2019 00:33

JOB# [id number]  [id number]

NM/NTS

## 2019-02-03 VITALS — SYSTOLIC BLOOD PRESSURE: 154 MMHG | DIASTOLIC BLOOD PRESSURE: 87 MMHG

## 2019-02-27 ENCOUNTER — HOSPITAL ENCOUNTER (OUTPATIENT)
Dept: HOSPITAL 5 - LAB | Age: 69
Discharge: HOME | End: 2019-02-27
Attending: INTERNAL MEDICINE
Payer: COMMERCIAL

## 2019-02-27 DIAGNOSIS — I10: ICD-10-CM

## 2019-02-27 DIAGNOSIS — K21.9: ICD-10-CM

## 2019-02-27 DIAGNOSIS — I82.409: Primary | ICD-10-CM

## 2019-02-27 DIAGNOSIS — M19.90: ICD-10-CM

## 2019-02-27 LAB
CRP SERPL-MCNC: 0.1 MG/DL (ref 0–1.3)
HDLC SERPL-MCNC: 51 MG/DL (ref 40–59)

## 2019-02-27 PROCEDURE — 85220 BLOOC CLOT FACTOR V TEST: CPT

## 2019-02-27 PROCEDURE — 85307 ASSAY ACTIVATED PROTEIN C: CPT

## 2019-02-27 PROCEDURE — 80061 LIPID PANEL: CPT

## 2019-02-27 PROCEDURE — 85305 CLOT INHIBIT PROT S TOTAL: CPT

## 2019-02-27 PROCEDURE — 86140 C-REACTIVE PROTEIN: CPT

## 2019-02-27 PROCEDURE — 83036 HEMOGLOBIN GLYCOSYLATED A1C: CPT

## 2019-02-27 PROCEDURE — 84153 ASSAY OF PSA TOTAL: CPT

## 2019-02-27 PROCEDURE — 83516 IMMUNOASSAY NONANTIBODY: CPT

## 2019-02-27 PROCEDURE — 36415 COLL VENOUS BLD VENIPUNCTURE: CPT

## 2019-06-11 NOTE — HISTORY AND PHYSICAL REPORT
History of Present Illness


Date of examination: 12/06/18


Chief complaint: 





This 68-year-old male with a history of left knee pain and swelling patient 

recently underwent left left knee arthroscopy with partial medial meniscectomy 

and abrasion chondroplasty postoperatively patient continued to complain of pain

mainly along the medial joint line therefore recommendations were made for a 

geniculate nerve block and possible radiofrequency ablation





Past History


Past Medical History: arthritis, hypertension


Past Surgical History: arthroscopy


Social history: no significant social history


Family history: no significant family history





Medications and Allergies


                                    Allergies











Allergy/AdvReac Type Severity Reaction Status Date / Time


 


codeine Allergy  Anaphylaxis Verified 01/30/19 11:37











                                Home Medications











 Medication  Instructions  Recorded  Confirmed  Last Taken  Type


 


Quinapril HCl [Accupril] 1 tab PO DAILY 04/26/18 01/30/19 01/29/19 21:00 History


 


Omeprazole 40 mg PO DAILY 07/03/18 01/30/19 01/29/19 21:00 History


 


Glucosamine HCl 500 mg PO QDAY 07/10/18 01/30/19 12/09/18 09:00 History


 


amLODIPine/ATORVASTATIN [Caduet 5 1 each PO QDAY 07/10/18 01/30/19 01/29/19 

21:00 History





mg-10 mg Tablet]     


 


HYDROcodone/APAP 5-325 [[place] 1 each PO Q4HR PRN #20 tablet 12/11/18 01/30/19 

Unknown Rx





5-325 mg TAB]     


 


Apixaban [Eliquis] 5 mg PO BID #60 tab.ds.pk 02/01/19  Unknown Rx


 


Apixaban [Eliquis] 10 mg PO BID 7 Days  tablet 02/01/19  Unknown Rx


 


Ferrous Gluconate [Fergon 325  mg PO QDAY #30 tablet 02/01/19  Unknown Rx





tab]     














Physical Examination





- Physical exam


Narrative exam: 





PHYSICAL examination significant muscles, findings relates to the left lower 

extremity hair patient's nausea have tenderness along the medial joint line with

us valgus deformity active range of motion full there is crepitus noted on 

active and passive range of motion ligaments appeared stable


Eyes: PERRL


ENT: Positive: clear oral mucosa


Respiratory effort: normal


Respiratory: bilateral: CTA


Rhythm: regular


Heart Sounds: Positive: S1 & S2


General gastrointestinal: Positive: soft, non-tender, non-distended, normal 

bowel sounds


Integumentary: clear, warm, dry


Neurologic: Positive: CNII-XII intact, moves all extremities, gait normal.  

Negative: focal deficits





- Cervical Spine


Neck pain: none


Tenderness with palpation: none


Full ROM: yes


ROM: flexion: normal


ROM: extension: normal


ROM: rotation right: normal


ROM: rotation left: normal


ROM: lateral flexion right: normal


ROM: lateral flexion left: normal





- Lumbar Spine


Back pain: none


Tenderness with palpation: none


Appearance: normal


Full ROM: yes


ROM: flexion: normal


ROM: extension: normal


ROM: rotation right: normal


ROM: rotation left: normal


ROM: lateral flexion right: normal


ROM: lateral flexion left: normal





Results





- Labs


Labs: 


All other labs normal.








Assessment and Plan





Persistent left knee pain





Recommend geniculate nerve block

## 2019-11-20 ENCOUNTER — HOSPITAL ENCOUNTER (OUTPATIENT)
Dept: HOSPITAL 5 - LAB | Age: 69
Discharge: HOME | End: 2019-11-20
Attending: INTERNAL MEDICINE
Payer: COMMERCIAL

## 2019-11-20 DIAGNOSIS — Z13.220: Primary | ICD-10-CM

## 2019-11-20 DIAGNOSIS — Z12.5: ICD-10-CM

## 2019-11-20 DIAGNOSIS — Z13.21: ICD-10-CM

## 2019-11-20 DIAGNOSIS — Z13.1: ICD-10-CM

## 2019-11-20 LAB
ALBUMIN SERPL-MCNC: 4.5 G/DL (ref 3.9–5)
ALT SERPL-CCNC: 16 UNITS/L (ref 7–56)
BASOPHILS # (AUTO): 0 K/MM3 (ref 0–0.1)
BASOPHILS NFR BLD AUTO: 0.6 % (ref 0–1.8)
BUN SERPL-MCNC: 13 MG/DL (ref 9–20)
BUN/CREAT SERPL: 13 %
CALCIUM SERPL-MCNC: 11 MG/DL (ref 8.4–10.2)
EOSINOPHIL # BLD AUTO: 0 K/MM3 (ref 0–0.4)
EOSINOPHIL NFR BLD AUTO: 0.1 % (ref 0–4.3)
HCT VFR BLD CALC: 51.2 % (ref 35.5–45.6)
HDLC SERPL-MCNC: 63 MG/DL (ref 40–59)
HEMOLYSIS INDEX: 7
HGB BLD-MCNC: 17.1 GM/DL (ref 11.8–15.2)
LYMPHOCYTES # BLD AUTO: 1.5 K/MM3 (ref 1.2–5.4)
LYMPHOCYTES NFR BLD AUTO: 33.6 % (ref 13.4–35)
MCHC RBC AUTO-ENTMCNC: 34 % (ref 32–34)
MCV RBC AUTO: 95 FL (ref 84–94)
MONOCYTES # (AUTO): 0.3 K/MM3 (ref 0–0.8)
MONOCYTES % (AUTO): 7.7 % (ref 0–7.3)
PLATELET # BLD: 232 K/MM3 (ref 140–440)
RBC # BLD AUTO: 5.37 M/MM3 (ref 3.65–5.03)

## 2019-11-20 PROCEDURE — 80053 COMPREHEN METABOLIC PANEL: CPT

## 2019-11-20 PROCEDURE — 84153 ASSAY OF PSA TOTAL: CPT

## 2019-11-20 PROCEDURE — 85025 COMPLETE CBC W/AUTO DIFF WBC: CPT

## 2019-11-20 PROCEDURE — 82607 VITAMIN B-12: CPT

## 2019-11-20 PROCEDURE — 82306 VITAMIN D 25 HYDROXY: CPT

## 2019-11-20 PROCEDURE — 80061 LIPID PANEL: CPT

## 2019-11-20 PROCEDURE — 83036 HEMOGLOBIN GLYCOSYLATED A1C: CPT

## 2019-11-20 PROCEDURE — 36415 COLL VENOUS BLD VENIPUNCTURE: CPT

## 2019-11-20 PROCEDURE — 84443 ASSAY THYROID STIM HORMONE: CPT

## 2019-11-23 LAB — VITAMIN D2 SERPL-MCNC: <4 NG/ML

## 2020-03-04 ENCOUNTER — HOSPITAL ENCOUNTER (OUTPATIENT)
Dept: HOSPITAL 5 - LAB | Age: 70
Discharge: HOME | End: 2020-03-04
Attending: INTERNAL MEDICINE
Payer: COMMERCIAL

## 2020-03-04 DIAGNOSIS — E78.5: Primary | ICD-10-CM

## 2020-03-04 DIAGNOSIS — D75.1: ICD-10-CM

## 2020-03-04 DIAGNOSIS — E83.52: ICD-10-CM

## 2020-03-04 LAB
BASOPHILS # (AUTO): 0 K/MM3 (ref 0–0.1)
BASOPHILS NFR BLD AUTO: 0.6 % (ref 0–1.8)
EOSINOPHIL # BLD AUTO: 0 K/MM3 (ref 0–0.4)
EOSINOPHIL NFR BLD AUTO: 0.2 % (ref 0–4.3)
HCT VFR BLD CALC: 47.2 % (ref 35.5–45.6)
HDLC SERPL-MCNC: 66 MG/DL (ref 40–59)
HGB BLD-MCNC: 15.5 GM/DL (ref 11.8–15.2)
LYMPHOCYTES # BLD AUTO: 1.4 K/MM3 (ref 1.2–5.4)
LYMPHOCYTES NFR BLD AUTO: 35.1 % (ref 13.4–35)
MCHC RBC AUTO-ENTMCNC: 33 % (ref 32–34)
MCV RBC AUTO: 97 FL (ref 84–94)
MONOCYTES # (AUTO): 0.4 K/MM3 (ref 0–0.8)
MONOCYTES % (AUTO): 10.5 % (ref 0–7.3)
PLATELET # BLD: 234 K/MM3 (ref 140–440)
RBC # BLD AUTO: 4.85 M/MM3 (ref 3.65–5.03)

## 2020-03-04 PROCEDURE — 80061 LIPID PANEL: CPT

## 2020-03-04 PROCEDURE — 36415 COLL VENOUS BLD VENIPUNCTURE: CPT

## 2020-03-04 PROCEDURE — 85025 COMPLETE CBC W/AUTO DIFF WBC: CPT

## 2020-03-04 PROCEDURE — 82330 ASSAY OF CALCIUM: CPT

## 2020-11-25 ENCOUNTER — HOSPITAL ENCOUNTER (OUTPATIENT)
Dept: HOSPITAL 5 - LAB | Age: 70
Discharge: HOME | End: 2020-11-25
Attending: INTERNAL MEDICINE
Payer: COMMERCIAL

## 2020-11-25 DIAGNOSIS — R73.03: Primary | ICD-10-CM

## 2020-11-25 DIAGNOSIS — E78.5: ICD-10-CM

## 2020-11-25 DIAGNOSIS — E55.9: ICD-10-CM

## 2020-11-25 DIAGNOSIS — Z00.00: ICD-10-CM

## 2020-11-25 LAB
ALBUMIN SERPL-MCNC: 4.1 G/DL (ref 3.9–5)
ALT SERPL-CCNC: 23 UNITS/L (ref 7–56)
BASOPHILS # (AUTO): 0 K/MM3 (ref 0–0.1)
BASOPHILS NFR BLD AUTO: 0.1 % (ref 0–1.8)
BUN SERPL-MCNC: 10 MG/DL (ref 9–20)
BUN/CREAT SERPL: 11 %
CALCIUM SERPL-MCNC: 10.6 MG/DL (ref 8.4–10.2)
EOSINOPHIL # BLD AUTO: 0 K/MM3 (ref 0–0.4)
EOSINOPHIL NFR BLD AUTO: 0.2 % (ref 0–4.3)
HCT VFR BLD CALC: 48.9 % (ref 35.5–45.6)
HDLC SERPL-MCNC: 62 MG/DL (ref 40–59)
HEMOLYSIS INDEX: 10
HGB BLD-MCNC: 16.4 GM/DL (ref 11.8–15.2)
LYMPHOCYTES # BLD AUTO: 1.6 K/MM3 (ref 1.2–5.4)
LYMPHOCYTES NFR BLD AUTO: 38.3 % (ref 13.4–35)
MCHC RBC AUTO-ENTMCNC: 34 % (ref 32–34)
MCV RBC AUTO: 99 FL (ref 84–94)
MONOCYTES # (AUTO): 0.3 K/MM3 (ref 0–0.8)
MONOCYTES % (AUTO): 8 % (ref 0–7.3)
PLATELET # BLD: 221 K/MM3 (ref 140–440)
RBC # BLD AUTO: 4.95 M/MM3 (ref 3.65–5.03)

## 2020-11-25 PROCEDURE — 80053 COMPREHEN METABOLIC PANEL: CPT

## 2020-11-25 PROCEDURE — 84153 ASSAY OF PSA TOTAL: CPT

## 2020-11-25 PROCEDURE — 82306 VITAMIN D 25 HYDROXY: CPT

## 2020-11-25 PROCEDURE — 84443 ASSAY THYROID STIM HORMONE: CPT

## 2020-11-25 PROCEDURE — 80061 LIPID PANEL: CPT

## 2020-11-25 PROCEDURE — 36415 COLL VENOUS BLD VENIPUNCTURE: CPT

## 2020-11-25 PROCEDURE — 83036 HEMOGLOBIN GLYCOSYLATED A1C: CPT

## 2020-11-25 PROCEDURE — 85025 COMPLETE CBC W/AUTO DIFF WBC: CPT

## 2021-01-22 ENCOUNTER — HOSPITAL ENCOUNTER (OUTPATIENT)
Dept: HOSPITAL 5 - LAB | Age: 71
Discharge: HOME | End: 2021-01-22
Attending: UROLOGY
Payer: COMMERCIAL

## 2021-01-22 DIAGNOSIS — R97.20: Primary | ICD-10-CM

## 2021-01-22 PROCEDURE — 84153 ASSAY OF PSA TOTAL: CPT

## 2021-01-22 PROCEDURE — 36415 COLL VENOUS BLD VENIPUNCTURE: CPT

## 2021-04-07 ENCOUNTER — HOSPITAL ENCOUNTER (OUTPATIENT)
Dept: HOSPITAL 5 - LAB | Age: 71
Discharge: HOME | End: 2021-04-07
Attending: INTERNAL MEDICINE
Payer: COMMERCIAL

## 2021-04-07 DIAGNOSIS — N52.9: ICD-10-CM

## 2021-04-07 DIAGNOSIS — Z13.29: Primary | ICD-10-CM

## 2021-04-07 PROCEDURE — 83718 ASSAY OF LIPOPROTEIN: CPT

## 2021-04-07 PROCEDURE — 36415 COLL VENOUS BLD VENIPUNCTURE: CPT

## 2021-04-07 PROCEDURE — 83970 ASSAY OF PARATHORMONE: CPT

## 2021-04-07 PROCEDURE — 82330 ASSAY OF CALCIUM: CPT

## 2021-04-28 ENCOUNTER — HOSPITAL ENCOUNTER (OUTPATIENT)
Dept: HOSPITAL 5 - US | Age: 71
Discharge: HOME | End: 2021-04-28
Attending: INTERNAL MEDICINE
Payer: COMMERCIAL

## 2021-04-28 DIAGNOSIS — E21.3: ICD-10-CM

## 2021-04-28 DIAGNOSIS — E04.1: Primary | ICD-10-CM

## 2021-04-28 PROCEDURE — 76536 US EXAM OF HEAD AND NECK: CPT

## 2021-04-28 NOTE — ULTRASOUND REPORT
ULTRASOUND THYROID



INDICATION / CLINICAL INFORMATION: HYPERPARATHYROIDISM,UNSPECIFIED.



COMPARISON: None available.



FINDINGS:



RIGHT LOBE: Size (cm) = 3.9 x 1.3 x 1.5 cm. 

LEFT LOBE: Size (cm) = 3.3 x 1.7 x 1.2 cm.

ISTHMUS: Thickness (cm) = 0.3 cm. 

APPEARANCE: Normal.



SMALL NODULES < 1 cm: Multiple subcentimeter hypoechoic nodules within the left inferior lobe. The la
rgest of these measures 7 mm.



NODULES >= 1 cm or SUSPICIOUS FEATURES (up to 4): None. 



LYMPH NODES: No abnormal lymph nodes.

PARATHYROID GLANDS: No abnormal parathyroid glands.

ADDITIONAL FINDINGS: None.



IMPRESSION:

1. No suspicious thyroid nodules. 

2. Subcentimeter nodules within the left inferior lobe, as above.









-------------------------------------------------------------------------------

ACR TI-RADS Thyroid Nodule Recommendations

-------------------------------------------------------------------------------

TI-RADS 1  (0 pts) -- BENIGN. 

  -   No Fine Needle Aspirate biopsy (FNA) or follow-up.

TI-RADS 2  (1-2 pts) -- NOT SUSPICIOUS. 

  -   No FNA or follow-up.

TI-RADS 3  (3 pts) -- MILDLY SUSPICIOUS. 

  -   >= 2.5 cm: FNA. 

  -   1.5-2.4 cm: Follow up at 1, 3, 5 years.

  -   < 1.5 cm: No follow up.

TI-RADS 4  (4-6 pts) -- MODERATELY SUSPICIOUS. 

  -   >= 1.5 cm: FNA. 

  -   1.0-1.4 cm: Follow up at 1, 2, 3, 5 years.

  -   < 1.0 cm: No follow up.

TI-RADS 5  (7+ pts) -- HIGHLY SUSPICIOUS. 

  -   >= 1.0 cm: FNA. 

  -   0.5-0.9 cm: Follow annually for 5 years.

  -   0.5 cm: No follow up.

-------------------------------------------------------------------------------



REFERENCE: ACR Thyroid Imaging, Reporting and Data System (TI-RADS): White Paper of the ACR TI-RADS C
ommittee. J AM Nadya Radiol 2017;14:587-595. 



NOTE: Nodules < 1 cm do not typically require follow-up or FNA unless there are suspicious features. 




NOTE: Nodule size maximum dimension determines whether a given lesion should be biopsied or followed.




NOTE: If multiple nodules meet criteria for FNA, only the two (2) most suspicious nodules should be b
iopsied. In addition, FNA of any suspicious cervical nodes should be biopsied.



NOTE: Predominantly Cystic nodules and Spongiform nodules, composed predominantly (>50%) of small cys
tic spaces, are considered benign (TI-RADS 1) regardless of other criteria. 



Scribed by: Denise Solomon RDMS, RVT 

Scribed: 4/28/2021 9:50 AM



Signer Name: Salvatore Shook MD 

Signed: 4/28/2021 11:34 AM

Workstation Name: [id number]

## 2021-05-05 ENCOUNTER — HOSPITAL ENCOUNTER (OUTPATIENT)
Dept: HOSPITAL 5 - LAB | Age: 71
Discharge: HOME | End: 2021-05-05
Attending: INTERNAL MEDICINE
Payer: COMMERCIAL

## 2021-05-05 DIAGNOSIS — E83.52: ICD-10-CM

## 2021-05-05 DIAGNOSIS — E55.9: Primary | ICD-10-CM

## 2021-05-05 DIAGNOSIS — E21.3: ICD-10-CM

## 2021-05-05 LAB
ALBUMIN SERPL-MCNC: 4.1 G/DL (ref 3.9–5)
ALT SERPL-CCNC: 23 UNITS/L (ref 7–56)
BUN SERPL-MCNC: 16 MG/DL (ref 9–20)
BUN/CREAT SERPL: 18 %
CALCIUM SERPL-MCNC: 10 MG/DL (ref 8.4–10.2)
HEMOLYSIS INDEX: 4

## 2021-05-05 PROCEDURE — 82330 ASSAY OF CALCIUM: CPT

## 2021-05-05 PROCEDURE — 83970 ASSAY OF PARATHORMONE: CPT

## 2021-05-05 PROCEDURE — 80053 COMPREHEN METABOLIC PANEL: CPT

## 2021-05-05 PROCEDURE — 36415 COLL VENOUS BLD VENIPUNCTURE: CPT

## 2021-05-05 PROCEDURE — 82306 VITAMIN D 25 HYDROXY: CPT

## 2021-05-10 LAB — VITAMIN D2 SERPL-MCNC: 35 NG/ML

## 2021-05-20 ENCOUNTER — HOSPITAL ENCOUNTER (OUTPATIENT)
Dept: HOSPITAL 5 - LAB | Age: 71
Discharge: HOME | End: 2021-05-20
Attending: INTERNAL MEDICINE
Payer: COMMERCIAL

## 2021-05-20 DIAGNOSIS — E83.52: Primary | ICD-10-CM

## 2021-05-20 LAB
CREATININE 24 HOUR,URINE: 1.4 (ref 0.8–2.8)
CREATININE,URINE: 194.4 MG/DL (ref 0.1–20)
TOTAL VOLUME,URINE: 700 ML

## 2021-05-20 PROCEDURE — 36415 COLL VENOUS BLD VENIPUNCTURE: CPT

## 2021-05-20 PROCEDURE — 82570 ASSAY OF URINE CREATININE: CPT

## 2021-07-23 ENCOUNTER — HOSPITAL ENCOUNTER (OUTPATIENT)
Dept: HOSPITAL 5 - LAB | Age: 71
Discharge: HOME | End: 2021-07-23
Attending: INTERNAL MEDICINE
Payer: COMMERCIAL

## 2021-07-23 DIAGNOSIS — R97.20: Primary | ICD-10-CM

## 2021-07-23 PROCEDURE — 36415 COLL VENOUS BLD VENIPUNCTURE: CPT

## 2021-07-23 PROCEDURE — 84153 ASSAY OF PSA TOTAL: CPT

## 2021-08-18 ENCOUNTER — HOSPITAL ENCOUNTER (OUTPATIENT)
Dept: HOSPITAL 5 - LAB | Age: 71
Discharge: HOME | End: 2021-08-18
Attending: INTERNAL MEDICINE
Payer: COMMERCIAL

## 2021-08-18 DIAGNOSIS — E83.52: Primary | ICD-10-CM

## 2021-08-18 LAB
ALBUMIN SERPL-MCNC: 4.1 G/DL (ref 3.9–5)
ALT SERPL-CCNC: 27 UNITS/L (ref 7–56)
BUN SERPL-MCNC: 13 MG/DL (ref 9–20)
BUN/CREAT SERPL: 13 %
CALCIUM SERPL-MCNC: 10.6 MG/DL (ref 8.4–10.2)
HEMOLYSIS INDEX: 12

## 2021-08-18 PROCEDURE — 80053 COMPREHEN METABOLIC PANEL: CPT

## 2021-08-18 PROCEDURE — 82306 VITAMIN D 25 HYDROXY: CPT

## 2021-08-18 PROCEDURE — 36415 COLL VENOUS BLD VENIPUNCTURE: CPT

## 2021-08-18 PROCEDURE — 83970 ASSAY OF PARATHORMONE: CPT

## 2021-08-23 LAB — VITAMIN D2 SERPL-MCNC: 10 NG/ML

## 2021-10-20 ENCOUNTER — HOSPITAL ENCOUNTER (OUTPATIENT)
Dept: HOSPITAL 5 - CT | Age: 71
Discharge: HOME | End: 2021-10-20
Attending: INTERNAL MEDICINE
Payer: COMMERCIAL

## 2021-10-20 DIAGNOSIS — E21.3: Primary | ICD-10-CM

## 2021-10-20 DIAGNOSIS — I77.810: ICD-10-CM

## 2021-10-20 DIAGNOSIS — R22.1: ICD-10-CM

## 2021-10-20 LAB — BUN SERPL-MCNC: 13 MG/DL (ref 9–20)

## 2021-10-20 PROCEDURE — 36415 COLL VENOUS BLD VENIPUNCTURE: CPT

## 2021-10-20 PROCEDURE — 84520 ASSAY OF UREA NITROGEN: CPT

## 2021-10-20 PROCEDURE — 70491 CT SOFT TISSUE NECK W/DYE: CPT

## 2021-10-20 PROCEDURE — 82565 ASSAY OF CREATININE: CPT

## 2021-10-20 NOTE — CAT SCAN REPORT
CT NECK WITH INTRAVENOUS CONTRAST AND MULTIPLANAR RECONSTRUCTION



CLINICAL HISTORY: LOCALIZED SWELLING ,MASS  100 ml omni 300  



TECHNIQUE:



2.5 mm thick contiguous axial scans were obtained from the skull base down to the aortic arch during 
intravenous contrast administration. In addition to evaluation of axial source images sagittal and co
darryl multiplanar reconstructions were produced and reviewed for this report.



Contrast dose report: Omnipaque 300:100 ML administered intravenously



All CT imaging studies performed at this facility utilize dose modulation, iterative reconstruction o
r weight based dosing, if appropriate, to obtain the lowest achievable radiation dose.





FINDINGS:



AIRWAY: No abnormalities are seen along the course of the airway. Nasopharynx, oropharynx, hypopharyn
x, larynx and visualized portions of the subglottic airway all have an unremarkable appearance.



LYMPH NODES: Small, normal-sized lymph nodes are observed in level 1A, level I B and level 2. There i
s no indication of cervical lymphadenopathy.



ORAL CAVITY/FLOOR OF MOUTH: Portions of the tongue are obscured by beam hardening artifact due to den
pool amalgam. Abnormalities are seen in evaluation of the oral cavity and tongue. The floor the mouth 
has a normal appearance.



MAJOR SALIVARY GLANDS: The submandibular salivary glands have an abnormal appearance bilaterally. Mil
d enlargement of the submandibular salivary glands is demonstrated bilaterally. Dilatation of the int
raglandular ductal structures is observed bilaterally. In addition small stones are present in both s
ubmandibular salivary glands. Ductal ectasia within the submandibular salivary glands is an unusual f
inding. Differential diagnostic considerations include chronic sialoadenitis due to sialolithiasis or
 Ignacio's duct structures, Sjogren's syndrome and idiopathic. There is no indication of inflammatory
 change in the adjacent fat. Possibility of acute sialoadenitis is considered unlikely. Administratio
n of intravenous contrast decreases sensitivity in the detection of ductal sialolithiasis. No definit
e ductal stones are identified. The parotid glands have a normal appearance.



NASAL CAVITY AND PARANASAL SINUSES: Evaluation of the nasal cavity reveals no abnormality. The parana
sal sinuses are free from inflammatory mucosal disease.



ORBITS:No abnormalities of the visualized portions of the orbits are identified. Globes, optic nerves
, extraocular muscles and lacrimal glands have an unremarkable appearance.



THYROID GLAND: The thyroid gland is normal in size and homogeneous in attenuation. No focal thyroid l
esions are identified.



TEMPORAL BONES:Mastoid air cells are normally pneumatized.



CERVICAL SPINE: Evaluation of the cervical spine reveals no significant abnormality. Normal alignment
 is maintained. No significant degenerative changes are identified.



LUNG APICES: Evaluation of the lung apices reveals no abnormality. There is no indication of lung nod
ule or infiltrate. Ectasia of the thoracic aorta is noted. This is incompletely evaluated. The visual
ized portions of the superior mediastinum have an otherwise unremarkable appearance.



CONTRAST ADMINISTRATION: Enhancement of normal vascular structures is demonstrated. No areas of abnor
mal contrast enhancement are identified. 



IMPRESSION:



1. Abnormal appearance of the submandibular salivary glands with intraglandular ductal ectasia bilate
rally as described above. Please refer to the above discussion. Correlation with the location of the 
patient's symptoms is suggested.



Signer Name: Zach Don MD 

Signed: 10/20/2021 9:10 AM

Workstation Name: [id number] Statement Selected

## 2021-12-01 ENCOUNTER — HOSPITAL ENCOUNTER (OUTPATIENT)
Dept: HOSPITAL 5 - LAB | Age: 71
Discharge: HOME | End: 2021-12-01
Attending: INTERNAL MEDICINE
Payer: COMMERCIAL

## 2021-12-01 DIAGNOSIS — D75.1: ICD-10-CM

## 2021-12-01 DIAGNOSIS — Z00.00: Primary | ICD-10-CM

## 2021-12-01 DIAGNOSIS — R73.03: ICD-10-CM

## 2021-12-01 DIAGNOSIS — E78.5: ICD-10-CM

## 2021-12-01 DIAGNOSIS — Z13.29: ICD-10-CM

## 2021-12-01 LAB
ALBUMIN SERPL-MCNC: 4.2 G/DL (ref 3.9–5)
ALT SERPL-CCNC: 32 UNITS/L (ref 7–56)
BASOPHILS # (AUTO): 0 K/MM3 (ref 0–0.1)
BASOPHILS NFR BLD AUTO: 0.4 % (ref 0–1.8)
BUN SERPL-MCNC: 13 MG/DL (ref 9–20)
BUN/CREAT SERPL: 13 %
CALCIUM SERPL-MCNC: 10.6 MG/DL (ref 8.4–10.2)
EOSINOPHIL # BLD AUTO: 0 K/MM3 (ref 0–0.4)
EOSINOPHIL NFR BLD AUTO: 0.2 % (ref 0–4.3)
HCT VFR BLD CALC: 49 % (ref 35.5–45.6)
HDLC SERPL-MCNC: 60 MG/DL (ref 40–59)
HEMOLYSIS INDEX: 5
HGB BLD-MCNC: 15.7 GM/DL (ref 11.8–15.2)
LYMPHOCYTES # BLD AUTO: 1.6 K/MM3 (ref 1.2–5.4)
LYMPHOCYTES NFR BLD AUTO: 37 % (ref 13.4–35)
MCHC RBC AUTO-ENTMCNC: 32 % (ref 32–34)
MCV RBC AUTO: 97 FL (ref 84–94)
MONOCYTES # (AUTO): 0.3 K/MM3 (ref 0–0.8)
MONOCYTES % (AUTO): 7.9 % (ref 0–7.3)
PLATELET # BLD: 224 K/MM3 (ref 140–440)
RBC # BLD AUTO: 5.03 M/MM3 (ref 3.65–5.03)

## 2021-12-01 PROCEDURE — 82306 VITAMIN D 25 HYDROXY: CPT

## 2021-12-01 PROCEDURE — 85025 COMPLETE CBC W/AUTO DIFF WBC: CPT

## 2021-12-01 PROCEDURE — 80061 LIPID PANEL: CPT

## 2021-12-01 PROCEDURE — 84443 ASSAY THYROID STIM HORMONE: CPT

## 2021-12-01 PROCEDURE — 80053 COMPREHEN METABOLIC PANEL: CPT

## 2021-12-01 PROCEDURE — 83036 HEMOGLOBIN GLYCOSYLATED A1C: CPT

## 2021-12-01 PROCEDURE — 36415 COLL VENOUS BLD VENIPUNCTURE: CPT

## 2022-01-27 ENCOUNTER — HOSPITAL ENCOUNTER (OUTPATIENT)
Dept: HOSPITAL 5 - LAB | Age: 72
Discharge: HOME | End: 2022-01-27
Attending: UROLOGY
Payer: COMMERCIAL

## 2022-01-27 DIAGNOSIS — N40.0: Primary | ICD-10-CM

## 2022-01-27 PROCEDURE — 36415 COLL VENOUS BLD VENIPUNCTURE: CPT

## 2022-01-27 PROCEDURE — 84153 ASSAY OF PSA TOTAL: CPT

## 2022-06-15 ENCOUNTER — HOSPITAL ENCOUNTER (OUTPATIENT)
Dept: HOSPITAL 5 - LAB | Age: 72
Discharge: HOME | End: 2022-06-15
Attending: INTERNAL MEDICINE
Payer: COMMERCIAL

## 2022-06-15 DIAGNOSIS — E78.5: ICD-10-CM

## 2022-06-15 DIAGNOSIS — R73.03: Primary | ICD-10-CM

## 2022-06-15 LAB — HDLC SERPL-MCNC: 65 MG/DL (ref 40–59)

## 2022-06-15 PROCEDURE — 80061 LIPID PANEL: CPT

## 2022-06-15 PROCEDURE — 36415 COLL VENOUS BLD VENIPUNCTURE: CPT

## 2022-06-15 PROCEDURE — 83036 HEMOGLOBIN GLYCOSYLATED A1C: CPT

## 2022-07-19 ENCOUNTER — HOSPITAL ENCOUNTER (OUTPATIENT)
Dept: HOSPITAL 5 - LAB | Age: 72
Discharge: HOME | End: 2022-07-19
Attending: UROLOGY
Payer: COMMERCIAL

## 2022-07-19 DIAGNOSIS — R97.20: Primary | ICD-10-CM

## 2022-07-19 PROCEDURE — 36415 COLL VENOUS BLD VENIPUNCTURE: CPT

## 2022-07-19 PROCEDURE — 84153 ASSAY OF PSA TOTAL: CPT

## 2022-08-16 LAB
ALBUMIN SERPL-MCNC: 4.2 G/DL (ref 3.9–5)
ALT SERPL-CCNC: 36 UNITS/L (ref 7–56)
BUN SERPL-MCNC: 14 MG/DL (ref 9–20)
BUN/CREAT SERPL: 13 %
CALCIUM SERPL-MCNC: 10.9 MG/DL (ref 8.4–10.2)
HCT VFR BLD CALC: 48.4 % (ref 35.5–45.6)
HEMOLYSIS INDEX: 11
HGB BLD-MCNC: 16.2 GM/DL (ref 11.8–15.2)
MCHC RBC AUTO-ENTMCNC: 33 % (ref 32–34)
MCV RBC AUTO: 98 FL (ref 84–94)
PLATELET # BLD: 215 K/MM3 (ref 140–440)
RBC # BLD AUTO: 4.96 M/MM3 (ref 3.65–5.03)

## 2022-08-17 ENCOUNTER — HOSPITAL ENCOUNTER (OUTPATIENT)
Dept: HOSPITAL 5 - OR | Age: 72
Discharge: HOME | End: 2022-08-17
Attending: UROLOGY
Payer: COMMERCIAL

## 2022-08-17 VITALS — SYSTOLIC BLOOD PRESSURE: 140 MMHG | DIASTOLIC BLOOD PRESSURE: 84 MMHG

## 2022-08-17 DIAGNOSIS — Z20.822: ICD-10-CM

## 2022-08-17 DIAGNOSIS — N35.812: ICD-10-CM

## 2022-08-17 DIAGNOSIS — R97.20: Primary | ICD-10-CM

## 2022-08-17 DIAGNOSIS — N40.0: ICD-10-CM

## 2022-08-17 DIAGNOSIS — Z91.81: ICD-10-CM

## 2022-08-17 DIAGNOSIS — Z86.718: ICD-10-CM

## 2022-08-17 DIAGNOSIS — K21.9: ICD-10-CM

## 2022-08-17 DIAGNOSIS — C61: ICD-10-CM

## 2022-08-17 DIAGNOSIS — Z82.49: ICD-10-CM

## 2022-08-17 DIAGNOSIS — Z98.890: ICD-10-CM

## 2022-08-17 DIAGNOSIS — D64.9: ICD-10-CM

## 2022-08-17 DIAGNOSIS — Z79.82: ICD-10-CM

## 2022-08-17 DIAGNOSIS — E78.00: ICD-10-CM

## 2022-08-17 DIAGNOSIS — I10: ICD-10-CM

## 2022-08-17 DIAGNOSIS — N35.819: ICD-10-CM

## 2022-08-17 DIAGNOSIS — Z79.899: ICD-10-CM

## 2022-08-17 DIAGNOSIS — Z88.5: ICD-10-CM

## 2022-08-17 DIAGNOSIS — Z80.1: ICD-10-CM

## 2022-08-17 DIAGNOSIS — M19.90: ICD-10-CM

## 2022-08-17 PROCEDURE — 74420 UROGRAPHY RTRGR +-KUB: CPT

## 2022-08-17 PROCEDURE — 88305 TISSUE EXAM BY PATHOLOGIST: CPT

## 2022-08-17 PROCEDURE — 80053 COMPREHEN METABOLIC PANEL: CPT

## 2022-08-17 PROCEDURE — 52281 CYSTOSCOPY AND TREATMENT: CPT

## 2022-08-17 PROCEDURE — 85027 COMPLETE CBC AUTOMATED: CPT

## 2022-08-17 PROCEDURE — 36415 COLL VENOUS BLD VENIPUNCTURE: CPT

## 2022-08-17 PROCEDURE — 76872 US TRANSRECTAL: CPT

## 2022-08-17 PROCEDURE — 55700: CPT

## 2022-08-17 PROCEDURE — C1769 GUIDE WIRE: HCPCS

## 2022-08-17 PROCEDURE — C1758 CATHETER, URETERAL: HCPCS

## 2022-08-17 PROCEDURE — C1726 CATH, BAL DIL, NON-VASCULAR: HCPCS

## 2022-08-17 PROCEDURE — U0003 INFECTIOUS AGENT DETECTION BY NUCLEIC ACID (DNA OR RNA); SEVERE ACUTE RESPIRATORY SYNDROME CORONAVIRUS 2 (SARS-COV-2) (CORONAVIRUS DISEASE [COVID-19]), AMPLIFIED PROBE TECHNIQUE, MAKING USE OF HIGH THROUGHPUT TECHNOLOGIES AS DESCRIBED BY CMS-2020-01-R: HCPCS

## 2022-08-17 NOTE — SHORT STAY SUMMARY
Short Stay Documentation





- History


H&P: obtained from office





- Allergies and Medications


Current Medications: 


                                    Allergies





codeine Allergy (Verified 01/30/19 11:37)


   Anaphylaxis





                                Home Medications











 Medication  Instructions  Recorded  Confirmed  Last Taken  Type


 


Quinapril HCl [Accupril] 40 mg PO DAILY 04/26/18 08/15/22 01/29/19 21:00 History


 


amLODIPine/ATORVASTATIN [Caduet 5 10 mg PO QDAY 07/10/18 08/15/22 01/29/19 21:00

 History





mg-10 mg Tablet]     


 


Aspirin [[place] Aspirin EC] 81 mg PO DAILY 08/12/22 08/12/22 Unknown History


 


Multi Vitamin-One A Day 1 tab PO DAILY 08/12/22 08/15/22 Unknown History








Active Medications





Hydrocodone Bitart/Acetaminophen (Hydrocodone/Acetaminophen 5-325 Mg Tab)  2 

each PO ONCE PRN


   PRN Reason: Pain, Moderate (4-6)


   Stop: 08/17/22 13:00


Hydromorphone HCl (Hydromorphone 0.5 Mg/0.5 Ml Inj)  0.5 mg IV Q10MIN PRN


   PRN Reason: Pain , Severe (7-10)


   Stop: 08/17/22 20:00


Lactated Ringer's (Lactated Ringers)  1,000 mls @ 100 mls/hr IV AS DIRECT KP


   Stop: 08/17/22 23:59











- Brief post op/procedure progress note


Date of procedure: 08/17/22


Pre-op diagnosis: elevated psa, bph


Post-op diagnosis: other (bulbar stricture)


Procedure: 





cysto, urethral dilation, pus 30cc, prostte bx, rpg


Anesthesia: GETA


Surgeon: MARY KATE ZUNIGA


Estimated blood loss: minimal


Specimen disposition: to lab (prostate cores)


Condition: stable





- Hospital course


Hospital course: 





ultram & macrobid on chart





- Disposition


Condition at discharge: Stable


Disposition: 01 HOME / SELF CARE / HOMELESS





Short Stay Discharge Plan


Follow up with: 


RITA FERNANDEZ MD [Primary Care Provider] - 7 Days

## 2022-08-17 NOTE — OPERATIVE REPORT
DATE OF SURGERY: 08/17/2022



PREOPERATIVE DIAGNOSES:  Elevated PSA, benign prostatic hypertrophy.



POSTOPERATIVE DIAGNOSES:  Elevated PSA, benign prostatic hypertrophy, urethral 

stricture.



PROCEDURES:  Cystoscopy, urethral dilatation, bilateral retrograde pyelograms, 

prostate ultrasound, biopsy of prostate (30 grams).



SURGEON:  Emeterio Du MD



ANESTHESIA:  General.



ESTIMATED BLOOD LOSS:  Minimal.



FLUIDS:  Crystalloid.



COMPLICATIONS:  No complications.



INDICATIONS:  This patient is a 72-year-old gentleman seen in the office with an

elevated PSA 4.8.  Repeat PSA was 8 by his primary care.  He also has some BPH 

symptoms.  He presents now for surgical intervention.  Risks, benefits, 

complications were explained.



DESCRIPTION OF PROCEDURE:  The patient was taken to the operative suite, placed 

in a supine position.  After adequate general anesthesia, placed in the dorsal 

lithotomy position, prepped and draped in a sterile fashion.  

Pancystourethroscopy was performed with 22-Turkmen Storz cystoscope.  The patient

was noted to have a tight bulbar stricture.  A 0.035 Glidewire was placed.  

Urethral dilatation to 20-Turkmen was then.  I was then able to advance the 

22-Turkmen cystoscope without difficulty.  He did have some mild trilobar 

obstruction.  Bladder, no tumors or stones were noted, did have mild 

trabeculation.  Both ureteral orifices in normal position.  Bilateral retrograde

pyelograms were obtained with an 8-Turkmen Dolores catheter and 8 mL of 

contrast.  No filling defects or obstruction.  Next, using a transrectal 

ultrasound, biplanar imaging was obtained.  No lesions could be appreciated, a 

30-gram gland could be appreciated.  Twelve core biopsies were taken, 4 from the

base, 4 from the mid, and 4 from the apex.  The patient tolerated the procedure 

well, did have some edema at the stricture site and therefore, I left a 

20-Turkmen Conway tip catheter.  Rectal exam was benign.  He was extubated and 

taken to the recovery room.  He will go home on Macrobid and Ultram.







DD: 08/17/2022 11:18 AM

DT: 08/17/2022 12:18 PM

TID: [id number] RECEIPT: [id number]

LINDY/СВЕТЛАНА

## 2022-08-17 NOTE — FLUOROSCOPY REPORT
INTRAOPERATIVE FLUOROSCOPY: RETROGRADE UROGRAPHY



INDICATION / CLINICAL INFORMATION:

CYSTOSCOPY W/ BILAT RPG AND PROSTATE BIOPSY.



TECHNIQUE:

Intraoperative spot images were obtained during the procedure.



FINDINGS:

Images show bilateral retrograde urography



See operative/procedure note by performing physician for full details.



Fluoroscopy Time: 15 seconds. Fluoroscopy Images: 5.



Signer Name: Juan Manuel Nolasco MD 

Signed: 8/17/2022 1:49 PM

Workstation Name: [email or web address]

## 2022-08-17 NOTE — ULTRASOUND REPORT
Transrectal prostate Ultrasound



HISTORY: Prostate biopsy.  Surgical guidance



TECHNIQUE:  Grayscale and color  imaging performed.



COMPARISON:  None



IMPRESSION: Ultrasound guidance provided for prostatic biopsy. Prostate volume is 26.4 mL. Heterogene
ous appearance. Please refer to the operative note for complete details.



Signer Name: Sudarshan Mitchell MD 

Signed: 8/17/2022 3:31 PM

Workstation Name: [id number]

## 2022-08-17 NOTE — ANESTHESIA CONSULTATION
Anesthesia Consult and Med Hx


Date of service: 08/17/22





- Airway


Anesthetic Teeth Evaluation: Good


ROM Head & Neck: Adequate


Mental/Hyoid Distance: Adequate


Mallampati Class: Class III


Intubation Access Assessment: Possibly Difficult





- Pre-Operative Health Status


ASA Pre-Surgery Classification: ASA2


Proposed Anesthetic Plan: General





- Pulmonary


Hx Smoking: No


Hx Respiratory Symptoms: No


Hx Sleep Apnea: No





- Cardiovascular System


Hx Hypertension: Yes (took amlodipine this morning)


Hx Heart Attack/AMI: No


Hx Percutaneous Transluminal Coronary Angioplasty (PTCA): No





- Central Nervous System


CVA: No





- Gastrointestinal


Hx Gastroesophageal Reflux Disease: Yes (controlled)





- Endocrine


Hx Renal Disease: No


Hx Liver Disease: No


Hx Insulin Dependent Diabetes: No


Hx Non-Insulin Dependent Diabetes: No


Hx Thyroid Disease: No





- Additional Comments


Anesthesia Medical History Comments: No hx anesthetic complications. Off ASA x 

5days.

## 2022-09-22 ENCOUNTER — HOSPITAL ENCOUNTER (OUTPATIENT)
Dept: HOSPITAL 5 - NM | Age: 72
Discharge: HOME | End: 2022-09-22
Attending: UROLOGY
Payer: COMMERCIAL

## 2022-09-22 DIAGNOSIS — C61: Primary | ICD-10-CM

## 2022-09-22 DIAGNOSIS — K40.90: ICD-10-CM

## 2022-09-22 PROCEDURE — 78306 BONE IMAGING WHOLE BODY: CPT

## 2022-09-22 PROCEDURE — A9503 TC99M MEDRONATE: HCPCS

## 2022-09-22 PROCEDURE — 74176 CT ABD & PELVIS W/O CONTRAST: CPT

## 2022-09-22 NOTE — NUCLEAR MEDICINE REPORT
NUCLEAR MEDICINE BONE SCAN, WHOLE BODY



INDICATION: C61. Initial staging of prostate cancer.



TECHNIQUE:  27.1 mCi of Tc-99m MDP were injected IV. Whole body images were obtained. Coned-down imag
es of the pelvis were also obtained.



COMPARISON:  CT abdomen pelvis without contrast performed the same day.



FINDINGS:

Skeletal Structures: Fairly symmetric, likely degenerative uptake is present involving the  shoulders
, sternoclavicular joints and knees.

Skeletal Lesions: None.



Soft Tissues: Normal.

Kidneys: Normal, symmetric activity.

Additional Findings: Contamination in the perineum is noted on the whole body images but absent on th
e coned-down images of the pelvis.



IMPRESSION:

 No evidence for osseous metastasis. Degenerative findings as described.. 



Signer Name: Ze Ulloa Jr, MD 

Signed: 9/22/2022 12:49 PM

Workstation Name: [id number]

## 2022-09-22 NOTE — CAT SCAN REPORT
CT ABDOMEN AND PELVIS WITHOUT CONTRAST



INDICATION / CLINICAL INFORMATION: C61. Malignant neoplasm of prostate



TECHNIQUE: Axial CT images were obtained through the abdomen and pelvis without IV contrast.  All CT 
scans at this location are performed using CT dose reduction for ALARA by means of automated exposure
 control. 



COMPARISON: None available.



FINDINGS:



LOWER CHEST: Left greater than right bibasilar scarring/atelectasis.

LIVER: Multiple lobulated low densities favor cyst/hamartomas.

GALLBLADDER: No significant abnormality.  

BILE DUCTS: No significant abnormality.

PANCREAS: No significant abnormality.

SPLEEN: No significant abnormality.

ADRENALS: Left medial adrenal limb lobulated small nodule measuring at least 2.1 x 1.6 cm of intermed
iate density HE 20

RIGHT KIDNEY / URETER: Hemorrhagic right upper pole cyst (HU 76.

LEFT KIDNEY / URETER: No significant abnormality.



STOMACH / SMALL BOWEL: No significant abnormality. 

COLON: No significant abnormality. 

APPENDIX: No significant abnormality.  

PERITONEUM: No free fluid. No free air. No fluid collection.

LYMPH NODES: No significant adenopathy.

AORTA / ARTERIES: No significant abnormality. 

IVC / VEINS: No significant abnormality.



URINARY BLADDER: No significant abnormality.

REPRODUCTIVE ORGANS: Prostate is enlarged.



ADDITIONAL FINDINGS: Fat-containing left inguinal hernia.



SKELETAL SYSTEM: No significant abnormality.



IMPRESSION:

1. Enlarged prostate without CT evidence of osseous or lymph node distant disease. There is an interm
ediate left adrenal nodule, with slightly irregular appearance. PET/CT or biopsy are recommended give
n new finding and malignancy history.



Signer Name: Larry Mcrae MD 

Signed: 9/22/2022 10:01 AM

Workstation Name: [email or web address]